# Patient Record
Sex: MALE | Race: BLACK OR AFRICAN AMERICAN
[De-identification: names, ages, dates, MRNs, and addresses within clinical notes are randomized per-mention and may not be internally consistent; named-entity substitution may affect disease eponyms.]

---

## 2017-06-20 ENCOUNTER — HOSPITAL ENCOUNTER (INPATIENT)
Dept: HOSPITAL 74 - YASAS | Age: 52
LOS: 28 days | Discharge: HOME | DRG: 895 | End: 2017-07-18
Attending: PSYCHIATRY & NEUROLOGY | Admitting: PSYCHIATRY & NEUROLOGY
Payer: COMMERCIAL

## 2017-06-20 VITALS — BODY MASS INDEX: 29.5 KG/M2

## 2017-06-20 DIAGNOSIS — F10.20: Primary | ICD-10-CM

## 2017-06-20 DIAGNOSIS — I10: ICD-10-CM

## 2017-06-20 DIAGNOSIS — F41.1: ICD-10-CM

## 2017-06-20 DIAGNOSIS — F17.210: ICD-10-CM

## 2017-06-20 DIAGNOSIS — K21.9: ICD-10-CM

## 2017-06-20 DIAGNOSIS — F32.9: ICD-10-CM

## 2017-06-20 LAB
ALBUMIN SERPL-MCNC: 4.2 G/DL (ref 3.4–5)
ALP SERPL-CCNC: 101 U/L (ref 45–117)
ALT SERPL-CCNC: 67 U/L (ref 12–78)
ANION GAP SERPL CALC-SCNC: 9 MMOL/L (ref 8–16)
APPEARANCE UR: (no result)
AST SERPL-CCNC: 62 U/L (ref 15–37)
BILIRUB SERPL-MCNC: 0.5 MG/DL (ref 0.2–1)
BILIRUB UR STRIP.AUTO-MCNC: NEGATIVE MG/DL
CALCIUM SERPL-MCNC: 9.7 MG/DL (ref 8.5–10.1)
CO2 SERPL-SCNC: 29 MMOL/L (ref 21–32)
COLOR UR: (no result)
CREAT SERPL-MCNC: 1.2 MG/DL (ref 0.7–1.3)
DEPRECATED RDW RBC AUTO: 15 % (ref 11.9–15.9)
GLUCOSE SERPL-MCNC: 86 MG/DL (ref 74–106)
KETONES UR QL STRIP: NEGATIVE
LEUKOCYTE ESTERASE UR QL STRIP.AUTO: NEGATIVE
MCH RBC QN AUTO: 28.5 PG (ref 25.7–33.7)
MCHC RBC AUTO-ENTMCNC: 32.7 G/DL (ref 32–35.9)
MCV RBC: 86.9 FL (ref 80–96)
MUCOUS THREADS URNS QL MICRO: (no result)
NITRITE UR QL STRIP: NEGATIVE
PH UR: 5 [PH] (ref 5–8)
PLATELET # BLD AUTO: 380 K/MM3 (ref 134–434)
PMV BLD: 7.9 FL (ref 7.5–11.1)
PROT SERPL-MCNC: 8.3 G/DL (ref 6.4–8.2)
PROT UR QL STRIP: (no result)
PROT UR QL STRIP: NEGATIVE
RBC # BLD AUTO: 3 /HPF (ref 0–3)
RBC # UR STRIP: NEGATIVE /UL
SP GR UR: 1.02 (ref 1–1.02)
UROBILINOGEN UR STRIP-MCNC: NEGATIVE E.U./DL (ref 0.2–1)
WBC # BLD AUTO: 7.4 K/MM3 (ref 4–10)
WBC # UR AUTO: 71 /HPF (ref 3–5)
YEAST #/AREA URNS HPF: (no result) /[HPF]

## 2017-06-20 RX ADMIN — MIRTAZAPINE SCH MG: 15 TABLET, FILM COATED ORAL at 21:49

## 2017-06-20 RX ADMIN — Medication SCH MG: at 21:49

## 2017-06-20 RX ADMIN — NICOTINE SCH: 14 PATCH, EXTENDED RELEASE TRANSDERMAL at 20:47

## 2017-06-20 RX ADMIN — ACETAMINOPHEN PRN MG: 325 TABLET ORAL at 19:16

## 2017-06-20 RX ADMIN — SERTRALINE HYDROCHLORIDE SCH MG: 50 TABLET ORAL at 21:48

## 2017-06-20 RX ADMIN — IBUPROFEN PRN MG: 400 TABLET, FILM COATED ORAL at 14:12

## 2017-06-20 NOTE — HP
Admission ROS [facility]





- [facility]


Chief Complaint: 


 franklin here for rehab from alcohol


Allergies/Adverse Reactions: 


 Allergies











Allergy/AdvReac Type Severity Reaction Status Date / Time


 


No Known Allergies Allergy   Verified 17 13:03











History of Present Illness: 


this 52 years old male with alcohol dependence,seeking rehab,,last detox [facility] 

from 06/15/17 to 17


hypertension


depression


no significant perios of sobriety


Exam Limitations: No Limitations





- Ebola screening


Have you traveled outside of the country in the last 21 days: No


Have you had contact with anyone from an Ebola affected area: No


Have you been sick,other than usual withdrawal symptoms: No





- Review of Systems


Constitutional: No Symptoms Reported


EENT: reports: No Symptoms Reported


Respiratory: reports: No Symptoms reported


Cardiac: reports: No Symptoms Reported


GI: reports: No Symptoms Reported


: reports: No Symptoms Reported


Musculoskeletal: reports: Neck Pain


Integumentary: reports: No Symptoms Reported


Neuro: reports: No Symptoms reported


Endocrine: reports: No Symptoms Reported


Hematology: reports: No Symptoms Reported


Psychiatric: reports: Mood/Affect Appropiate, Orientated x3, Depressed





Patient History





- Patient Medical History


Hx Anemia: No


Hx Asthma: No


Hx Chronic Obstructive Pulmonary Disease (COPD): No


Hx Cancer: No


Hx Cardiac Disorders: No


Hx Congestive Heart Failure: No


Hx Hypertension: Yes (on med)


Hx Hypercholesterolemia: No


Hx Pacemaker: No


HX Cerebrovascular Accident: No


Hx Seizures: No


Hx Diabetes: No


Hx Gastrointestinal Disorders: No


Hx Liver Disease: No


Hx Genitourinary Disorders: No


Hx Sexually Transmitted Disorders: No


Hx Renal Disease (ESRD): No


Hx Thyroid Disease: No


Hx Human Immunodeficiency Virus (HIV): No (last 2016 negative)


Hx Hepatitis C: No


Hx Depression: Yes (non compliant )


Hx Suicide Attempt: No


Hx Schizophrenia: No


Other Medical History: no suicidal,no homicidal





- Patient Surgical History


Past Surgical History: No


Hx Neurologic Surgery: No


Hx Cataract Extraction: No


Hx Cardiac Surgery: No


Hx Lung Surgery: No


Hx Breast Surgery: No


Hx Breast Biopsy: No


Hx Abdominal Surgery: Yes


Hx Appendectomy: No


Hx Cholecystectomy: No


Hx Genitourinary Surgery: No


Hx  Section: No


Hx Orthopedic Surgery: Yes (right tight abd had a colonostomy bg for 2 years 

prior to closure )


Anesthesia Reaction: No





- PPD History


Previous Implant?: Yes


Date: 16


PPD to be Administered?: Yes





- Smoking Cessation


Smoking history: Former smoker


Have you smoked in the past 12 months: Yes


Aproximately how many cigarettes per day: 3


Cigars Per Day: 0


Hx Chewing Tobacco Use: No


Initiated information on smoking cessation: Yes


'Breaking Loose' booklet given: 17





- Substance & Tx. History


Hx Alcohol Use: Yes


Substance Use Type: Alcohol


Hx Substance Use Treatment: Yes ([facility] 06/15/17 to 17)





- Substances Abused


  ** Alcohol


Route: Oral


Frequency: Daily


Amount used: 4 of 24 oz of liquor


Age of first use: 16


Date of Last Use: 06/15/17





Family Disease History





- Family Disease History


Family Disease History: CA: Grandparent, Other: Father (alcohol,), 

Mother (alcohol,)





Admission Physical Exam [facility]





- Vital Signs


Vital Signs: 


 Vital Signs - 24 hr











  17





  11:32


 


Temperature 97.3 F L


 


Pulse Rate 106 H


 


Respiratory 18





Rate 


 


Blood Pressure 137/87














- Physical


General Appearance: Yes: Within Normal Limits


HEENTM: Yes: Within Normal Limits, Normal ENT Inspection, Pharynx Normal


Respiratory: Yes: Lungs Clear, Normal Breath Sounds, No Respiratory Distress


Neck: Yes: Within Normal Limits


Breast: Yes: Within Normal Limits


Cardiology: Yes: Within Normal Limits, Regular Rhythm, Regular Rate, S1, S2


Abdominal: Yes: Within Normal Limits, Normal Bowel Sounds, Non Tender, Flat, 

Soft, Surgical Scar


Genitourinary: Yes: Within Normal Limits


Back: Yes: Within Normal Limits


Musculoskeletal: Yes: Within Normal Limits


Extremities: Yes: Within Normal Limits


Neurological: Yes: CNs II-XII NML intact, Fully Oriented, Alert, Motor Strength 

5/5


Integumentary: Yes: Within Normal Limits


Lymphatic: Yes: Within Normal Limits





- Diagnostic


(1) Alcohol dependence


Current Visit: No   Status: Acute   





(2) Hypertension


Current Visit: Yes   Status: Acute   





(3) Depression


Current Visit: Yes   Status: Acute   








Cleared for Admission [facility]





- Detox or Rehab


Claeared for Rehab Admission: Yes





[facility] Breath Alcohol Content


Breath Alcohol Content: 0





Urine Drug Screen





- Results


Drug Screen Negative: No


Urine Drug Screen Results: BZO-Benzodiazepines

## 2017-06-21 RX ADMIN — NICOTINE SCH: 14 PATCH, EXTENDED RELEASE TRANSDERMAL at 10:33

## 2017-06-21 RX ADMIN — HYDROXYZINE PAMOATE PRN MG: 50 CAPSULE ORAL at 22:14

## 2017-06-21 RX ADMIN — MIRTAZAPINE SCH MG: 15 TABLET, FILM COATED ORAL at 21:27

## 2017-06-21 RX ADMIN — LISINOPRIL SCH MG: 20 TABLET ORAL at 10:31

## 2017-06-21 RX ADMIN — Medication SCH MG: at 21:27

## 2017-06-21 RX ADMIN — SERTRALINE HYDROCHLORIDE SCH MG: 50 TABLET ORAL at 10:31

## 2017-06-21 RX ADMIN — IBUPROFEN PRN MG: 400 TABLET, FILM COATED ORAL at 21:27

## 2017-06-21 RX ADMIN — Medication SCH TAB: at 10:31

## 2017-06-21 RX ADMIN — HYDROXYZINE PAMOATE PRN MG: 50 CAPSULE ORAL at 11:01
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
## 2017-06-21 NOTE — EKG
Test Reason : 

Blood Pressure : ***/*** mmHG

Vent. Rate : 089 BPM     Atrial Rate : 089 BPM

   P-R Int : 186 ms          QRS Dur : 092 ms

    QT Int : 384 ms       P-R-T Axes : 055 000 006 degrees

   QTc Int : 467 ms

 

NORMAL SINUS RHYTHM

POSSIBLE LEFT ATRIAL ENLARGEMENT

LEFT VENTRICULAR HYPERTROPHY

ABNORMAL ECG

NO PREVIOUS ECGS AVAILABLE

Confirmed by DAVIDSON SAMANO MD ([id number]) on 6/21/2017 11:05:04 AM

 

Referred By:             Confirmed By:DAVIDSON SAMANO MD

## 2017-06-22 RX ADMIN — Medication SCH MG: at 21:50

## 2017-06-22 RX ADMIN — CYCLOBENZAPRINE HYDROCHLORIDE PRN MG: 10 TABLET, FILM COATED ORAL at 18:45

## 2017-06-22 RX ADMIN — HYDROXYZINE PAMOATE PRN MG: 50 CAPSULE ORAL at 21:52

## 2017-06-22 RX ADMIN — HYDROXYZINE PAMOATE PRN MG: 50 CAPSULE ORAL at 10:21

## 2017-06-22 RX ADMIN — SERTRALINE HYDROCHLORIDE SCH MG: 50 TABLET ORAL at 10:20

## 2017-06-22 RX ADMIN — NICOTINE SCH: 14 PATCH, EXTENDED RELEASE TRANSDERMAL at 10:23

## 2017-06-22 RX ADMIN — Medication SCH TAB: at 10:20

## 2017-06-22 RX ADMIN — IBUPROFEN PRN MG: 600 TABLET, FILM COATED ORAL at 18:46

## 2017-06-22 RX ADMIN — MIRTAZAPINE SCH MG: 15 TABLET, FILM COATED ORAL at 21:49

## 2017-06-22 RX ADMIN — LISINOPRIL SCH MG: 20 TABLET ORAL at 10:21

## 2017-06-23 RX ADMIN — MIRTAZAPINE SCH MG: 15 TABLET, FILM COATED ORAL at 21:46

## 2017-06-23 RX ADMIN — LISINOPRIL SCH MG: 20 TABLET ORAL at 10:21

## 2017-06-23 RX ADMIN — IBUPROFEN PRN MG: 600 TABLET, FILM COATED ORAL at 10:22

## 2017-06-23 RX ADMIN — Medication SCH TAB: at 10:21

## 2017-06-23 RX ADMIN — IBUPROFEN PRN MG: 600 TABLET, FILM COATED ORAL at 21:45

## 2017-06-23 RX ADMIN — CYCLOBENZAPRINE HYDROCHLORIDE PRN MG: 10 TABLET, FILM COATED ORAL at 10:22

## 2017-06-23 RX ADMIN — NICOTINE SCH: 14 PATCH, EXTENDED RELEASE TRANSDERMAL at 10:21

## 2017-06-23 RX ADMIN — CYCLOBENZAPRINE HYDROCHLORIDE PRN MG: 10 TABLET, FILM COATED ORAL at 21:46

## 2017-06-23 RX ADMIN — SERTRALINE HYDROCHLORIDE SCH MG: 50 TABLET ORAL at 10:21

## 2017-06-23 RX ADMIN — Medication SCH MG: at 21:46

## 2017-06-24 RX ADMIN — HYDROXYZINE PAMOATE PRN MG: 50 CAPSULE ORAL at 21:35

## 2017-06-24 RX ADMIN — LISINOPRIL SCH MG: 20 TABLET ORAL at 10:14

## 2017-06-24 RX ADMIN — Medication SCH TAB: at 10:14

## 2017-06-24 RX ADMIN — NICOTINE SCH: 14 PATCH, EXTENDED RELEASE TRANSDERMAL at 10:14

## 2017-06-24 RX ADMIN — CYCLOBENZAPRINE HYDROCHLORIDE PRN MG: 10 TABLET, FILM COATED ORAL at 10:16

## 2017-06-24 RX ADMIN — SERTRALINE HYDROCHLORIDE SCH MG: 50 TABLET ORAL at 10:14

## 2017-06-24 RX ADMIN — Medication SCH MG: at 21:35

## 2017-06-24 RX ADMIN — CYCLOBENZAPRINE HYDROCHLORIDE PRN MG: 10 TABLET, FILM COATED ORAL at 21:35

## 2017-06-24 RX ADMIN — MIRTAZAPINE SCH MG: 15 TABLET, FILM COATED ORAL at 21:35

## 2017-06-24 RX ADMIN — IBUPROFEN PRN MG: 600 TABLET, FILM COATED ORAL at 21:35

## 2017-06-24 RX ADMIN — IBUPROFEN PRN MG: 600 TABLET, FILM COATED ORAL at 10:16

## 2017-06-25 RX ADMIN — Medication SCH TAB: at 10:27

## 2017-06-25 RX ADMIN — CYCLOBENZAPRINE HYDROCHLORIDE PRN MG: 10 TABLET, FILM COATED ORAL at 10:30

## 2017-06-25 RX ADMIN — HYDROXYZINE PAMOATE PRN MG: 50 CAPSULE ORAL at 21:50

## 2017-06-25 RX ADMIN — Medication SCH MG: at 21:47

## 2017-06-25 RX ADMIN — HYDROXYZINE PAMOATE PRN MG: 50 CAPSULE ORAL at 16:11

## 2017-06-25 RX ADMIN — IBUPROFEN PRN MG: 600 TABLET, FILM COATED ORAL at 21:49

## 2017-06-25 RX ADMIN — NICOTINE SCH: 14 PATCH, EXTENDED RELEASE TRANSDERMAL at 10:27

## 2017-06-25 RX ADMIN — MIRTAZAPINE SCH MG: 15 TABLET, FILM COATED ORAL at 21:47

## 2017-06-25 RX ADMIN — IBUPROFEN PRN MG: 600 TABLET, FILM COATED ORAL at 10:29

## 2017-06-25 RX ADMIN — LISINOPRIL SCH MG: 20 TABLET ORAL at 10:28

## 2017-06-25 RX ADMIN — ALUMINUM HYDROXIDE, MAGNESIUM HYDROXIDE, AND SIMETHICONE PRN ML: 200; 200; 20 SUSPENSION ORAL at 16:11

## 2017-06-25 RX ADMIN — SERTRALINE HYDROCHLORIDE SCH MG: 50 TABLET ORAL at 10:28

## 2017-06-26 RX ADMIN — MIRTAZAPINE SCH MG: 15 TABLET, FILM COATED ORAL at 21:32

## 2017-06-26 RX ADMIN — SERTRALINE HYDROCHLORIDE SCH MG: 50 TABLET ORAL at 10:37

## 2017-06-26 RX ADMIN — IBUPROFEN PRN MG: 600 TABLET, FILM COATED ORAL at 10:38

## 2017-06-26 RX ADMIN — HYDROXYZINE PAMOATE PRN MG: 50 CAPSULE ORAL at 20:10

## 2017-06-26 RX ADMIN — Medication SCH MG: at 21:34

## 2017-06-26 RX ADMIN — IBUPROFEN PRN MG: 600 TABLET, FILM COATED ORAL at 21:33

## 2017-06-26 RX ADMIN — HYDROXYZINE PAMOATE PRN MG: 50 CAPSULE ORAL at 10:37

## 2017-06-26 RX ADMIN — ALUMINUM HYDROXIDE, MAGNESIUM HYDROXIDE, AND SIMETHICONE PRN ML: 200; 200; 20 SUSPENSION ORAL at 20:11

## 2017-06-26 RX ADMIN — LISINOPRIL SCH MG: 20 TABLET ORAL at 10:40

## 2017-06-26 RX ADMIN — CYCLOBENZAPRINE HYDROCHLORIDE PRN MG: 10 TABLET, FILM COATED ORAL at 21:32

## 2017-06-26 RX ADMIN — NICOTINE SCH: 14 PATCH, EXTENDED RELEASE TRANSDERMAL at 10:39

## 2017-06-26 RX ADMIN — Medication SCH TAB: at 10:37

## 2017-06-27 RX ADMIN — Medication SCH TAB: at 10:21

## 2017-06-27 RX ADMIN — HYDROXYZINE PAMOATE PRN MG: 50 CAPSULE ORAL at 20:25

## 2017-06-27 RX ADMIN — IBUPROFEN PRN MG: 600 TABLET, FILM COATED ORAL at 10:23

## 2017-06-27 RX ADMIN — SERTRALINE HYDROCHLORIDE SCH MG: 50 TABLET ORAL at 10:22

## 2017-06-27 RX ADMIN — NICOTINE SCH: 14 PATCH, EXTENDED RELEASE TRANSDERMAL at 10:22

## 2017-06-27 RX ADMIN — Medication SCH MG: at 21:43

## 2017-06-27 RX ADMIN — ANALGESIC BALM SCH APPLIC: 1.74; 4.06 OINTMENT TOPICAL at 10:21

## 2017-06-27 RX ADMIN — CYCLOBENZAPRINE HYDROCHLORIDE PRN MG: 10 TABLET, FILM COATED ORAL at 21:45

## 2017-06-27 RX ADMIN — IBUPROFEN PRN MG: 600 TABLET, FILM COATED ORAL at 21:44

## 2017-06-27 RX ADMIN — CYCLOBENZAPRINE HYDROCHLORIDE PRN MG: 10 TABLET, FILM COATED ORAL at 10:23

## 2017-06-27 RX ADMIN — ALUMINUM HYDROXIDE, MAGNESIUM HYDROXIDE, AND SIMETHICONE PRN ML: 200; 200; 20 SUSPENSION ORAL at 20:25

## 2017-06-27 RX ADMIN — MIRTAZAPINE SCH MG: 15 TABLET, FILM COATED ORAL at 21:43

## 2017-06-27 RX ADMIN — ANALGESIC BALM SCH: 1.74; 4.06 OINTMENT TOPICAL at 21:43

## 2017-06-27 RX ADMIN — LISINOPRIL SCH MG: 20 TABLET ORAL at 10:21

## 2017-06-27 NOTE — PN
[facility] Progress Note (SOAP)


Subjective: 


neck pain x 1 week worse with left sided movements


Objective: 





06/27/17 08:59


 Vital Signs











Temperature  97.9 F   06/27/17 06:50


 


Pulse Rate  83   06/27/17 06:50


 


Respiratory Rate  18   06/27/17 06:50


 


Blood Pressure  136/87   06/27/17 06:50


 


O2 Sat by Pulse Oximetry (%)      








 Laboratory Tests











  06/20/17 06/20/17 06/20/17





  13:00 13:00 13:00


 


WBC  7.4  D  


 


RBC  4.91  


 


Hgb  14.0  


 


Hct  42.7  


 


MCV  86.9  


 


MCHC  32.7  


 


RDW  15.0  


 


Plt Count  380  D  


 


MPV  7.9  


 


Sodium   136 


 


Potassium   4.3 


 


Chloride   98 


 


Carbon Dioxide   29 


 


Anion Gap   9 


 


BUN   16  D 


 


Creatinine   1.2 


 


Creat Clearance w eGFR   > 60 


 


Random Glucose   86 


 


Calcium   9.7 


 


Total Bilirubin   0.5 


 


AST   62 H D 


 


ALT   67  D 


 


Alkaline Phosphatase   101 


 


Total Protein   8.3 H 


 


Albumin   4.2 


 


Urine Color   


 


Urine Appearance   


 


Urine pH   


 


Ur Specific Gravity   


 


Urine Protein   


 


Urine Glucose (UA)   


 


Urine Ketones   


 


Urine Blood   


 


Urine Nitrite   


 


Urine Bilirubin   


 


Urine Urobilinogen   


 


Ur Leukocyte Esterase   


 


Urine RBC   


 


Urine WBC   


 


Urine Mucus   


 


Urine Yeast   


 


RPR Titer    Nonreactive














  06/20/17





  15:00


 


WBC 


 


RBC 


 


Hgb 


 


Hct 


 


MCV 


 


MCHC 


 


RDW 


 


Plt Count 


 


MPV 


 


Sodium 


 


Potassium 


 


Chloride 


 


Carbon Dioxide 


 


Anion Gap 


 


BUN 


 


Creatinine 


 


Creat Clearance w eGFR 


 


Random Glucose 


 


Calcium 


 


Total Bilirubin 


 


AST 


 


ALT 


 


Alkaline Phosphatase 


 


Total Protein 


 


Albumin 


 


Urine Color  Green


 


Urine Appearance  Turbid


 


Urine pH  5.0


 


Ur Specific Gravity  1.025


 


Urine Protein  1+ H


 


Urine Glucose (UA)  Negative


 


Urine Ketones  Negative


 


Urine Blood  Negative


 


Urine Nitrite  Negative


 


Urine Bilirubin  Negative


 


Urine Urobilinogen  Negative


 


Ur Leukocyte Esterase  Negative


 


Urine RBC  3


 


Urine WBC  71


 


Urine Mucus  Rare


 


Urine Yeast  Many


 


RPR Titer 








pt aox3 in nad ambulatng 


neck soft +dolor on left sided movemnts 


Assessment: 





06/27/17 09:02


neck pain -likely musculosketetal 


Plan: 


motri 600mg tid


flexeril 10mg tid 


x ray c-spine 


analgesic balm

## 2017-06-28 LAB
APPEARANCE UR: CLEAR
BILIRUB UR STRIP.AUTO-MCNC: NEGATIVE MG/DL
COLOR UR: (no result)
KETONES UR QL STRIP: NEGATIVE
LEUKOCYTE ESTERASE UR QL STRIP.AUTO: NEGATIVE
NITRITE UR QL STRIP: NEGATIVE
PH UR: 5 [PH] (ref 5–8)
PROT UR QL STRIP: NEGATIVE
PROT UR QL STRIP: NEGATIVE
RBC # UR STRIP: NEGATIVE /UL
SP GR UR: 1.02 (ref 1–1.02)
UROBILINOGEN UR STRIP-MCNC: NEGATIVE E.U./DL (ref 0.2–1)

## 2017-06-28 RX ADMIN — ANALGESIC BALM SCH APPLIC: 1.74; 4.06 OINTMENT TOPICAL at 22:32

## 2017-06-28 RX ADMIN — NICOTINE SCH: 14 PATCH, EXTENDED RELEASE TRANSDERMAL at 10:05

## 2017-06-28 RX ADMIN — IBUPROFEN PRN MG: 600 TABLET, FILM COATED ORAL at 09:48

## 2017-06-28 RX ADMIN — Medication SCH TAB: at 09:47

## 2017-06-28 RX ADMIN — Medication SCH MG: at 21:32

## 2017-06-28 RX ADMIN — CYCLOBENZAPRINE HYDROCHLORIDE PRN MG: 10 TABLET, FILM COATED ORAL at 09:48

## 2017-06-28 RX ADMIN — HYDROXYZINE PAMOATE PRN MG: 50 CAPSULE ORAL at 21:32

## 2017-06-28 RX ADMIN — ANALGESIC BALM SCH: 1.74; 4.06 OINTMENT TOPICAL at 10:05

## 2017-06-28 RX ADMIN — ALUMINUM HYDROXIDE, MAGNESIUM HYDROXIDE, AND SIMETHICONE PRN ML: 200; 200; 20 SUSPENSION ORAL at 18:16

## 2017-06-28 RX ADMIN — SERTRALINE HYDROCHLORIDE SCH MG: 50 TABLET ORAL at 09:47

## 2017-06-28 RX ADMIN — LISINOPRIL SCH MG: 20 TABLET ORAL at 09:47

## 2017-06-28 RX ADMIN — MIRTAZAPINE SCH MG: 15 TABLET, FILM COATED ORAL at 21:32

## 2017-06-28 RX ADMIN — ANALGESIC BALM SCH: 1.74; 4.06 OINTMENT TOPICAL at 22:00

## 2017-06-28 RX ADMIN — CYCLOBENZAPRINE HYDROCHLORIDE PRN MG: 10 TABLET, FILM COATED ORAL at 21:32

## 2017-06-28 RX ADMIN — IBUPROFEN PRN MG: 600 TABLET, FILM COATED ORAL at 21:32

## 2017-06-29 RX ADMIN — IBUPROFEN PRN MG: 600 TABLET, FILM COATED ORAL at 10:06

## 2017-06-29 RX ADMIN — Medication SCH MG: at 21:06

## 2017-06-29 RX ADMIN — HYDROXYZINE PAMOATE PRN MG: 50 CAPSULE ORAL at 21:07

## 2017-06-29 RX ADMIN — ANALGESIC BALM SCH APPLIC: 1.74; 4.06 OINTMENT TOPICAL at 21:05

## 2017-06-29 RX ADMIN — CYCLOBENZAPRINE HYDROCHLORIDE PRN MG: 10 TABLET, FILM COATED ORAL at 21:09

## 2017-06-29 RX ADMIN — HYDROXYZINE PAMOATE PRN MG: 50 CAPSULE ORAL at 10:06

## 2017-06-29 RX ADMIN — LISINOPRIL SCH MG: 20 TABLET ORAL at 10:05

## 2017-06-29 RX ADMIN — IBUPROFEN PRN MG: 600 TABLET, FILM COATED ORAL at 21:07

## 2017-06-29 RX ADMIN — NICOTINE SCH: 14 PATCH, EXTENDED RELEASE TRANSDERMAL at 10:08

## 2017-06-29 RX ADMIN — Medication SCH TAB: at 10:09

## 2017-06-29 RX ADMIN — ALUMINUM HYDROXIDE, MAGNESIUM HYDROXIDE, AND SIMETHICONE PRN ML: 200; 200; 20 SUSPENSION ORAL at 15:59

## 2017-06-29 RX ADMIN — MIRTAZAPINE SCH MG: 15 TABLET, FILM COATED ORAL at 21:06

## 2017-06-29 RX ADMIN — ANALGESIC BALM SCH: 1.74; 4.06 OINTMENT TOPICAL at 10:08

## 2017-06-29 RX ADMIN — SERTRALINE HYDROCHLORIDE SCH MG: 50 TABLET ORAL at 10:05

## 2017-06-30 RX ADMIN — HYDROXYZINE PAMOATE PRN MG: 50 CAPSULE ORAL at 21:38

## 2017-06-30 RX ADMIN — Medication SCH MG: at 21:38

## 2017-06-30 RX ADMIN — MIRTAZAPINE SCH MG: 15 TABLET, FILM COATED ORAL at 21:39

## 2017-06-30 RX ADMIN — NICOTINE SCH: 14 PATCH, EXTENDED RELEASE TRANSDERMAL at 10:21

## 2017-06-30 RX ADMIN — Medication SCH TAB: at 10:21

## 2017-06-30 RX ADMIN — CYCLOBENZAPRINE HYDROCHLORIDE PRN MG: 10 TABLET, FILM COATED ORAL at 10:23

## 2017-06-30 RX ADMIN — ANALGESIC BALM SCH: 1.74; 4.06 OINTMENT TOPICAL at 10:21

## 2017-06-30 RX ADMIN — ANALGESIC BALM SCH: 1.74; 4.06 OINTMENT TOPICAL at 22:56

## 2017-06-30 RX ADMIN — IBUPROFEN PRN MG: 600 TABLET, FILM COATED ORAL at 10:23

## 2017-06-30 RX ADMIN — LISINOPRIL SCH MG: 20 TABLET ORAL at 10:21

## 2017-06-30 RX ADMIN — CYCLOBENZAPRINE HYDROCHLORIDE PRN MG: 10 TABLET, FILM COATED ORAL at 21:39

## 2017-06-30 RX ADMIN — SERTRALINE HYDROCHLORIDE SCH MG: 50 TABLET ORAL at 10:21

## 2017-06-30 RX ADMIN — IBUPROFEN PRN MG: 600 TABLET, FILM COATED ORAL at 21:39

## 2017-07-01 RX ADMIN — IBUPROFEN PRN MG: 600 TABLET, FILM COATED ORAL at 21:16

## 2017-07-01 RX ADMIN — Medication SCH MG: at 21:15

## 2017-07-01 RX ADMIN — SERTRALINE HYDROCHLORIDE SCH MG: 50 TABLET ORAL at 10:06

## 2017-07-01 RX ADMIN — IBUPROFEN PRN MG: 600 TABLET, FILM COATED ORAL at 10:07

## 2017-07-01 RX ADMIN — NICOTINE SCH: 14 PATCH, EXTENDED RELEASE TRANSDERMAL at 10:08

## 2017-07-01 RX ADMIN — Medication SCH TAB: at 10:06

## 2017-07-01 RX ADMIN — LISINOPRIL SCH MG: 20 TABLET ORAL at 10:06

## 2017-07-01 RX ADMIN — MIRTAZAPINE SCH MG: 15 TABLET, FILM COATED ORAL at 21:15

## 2017-07-01 RX ADMIN — ANALGESIC BALM SCH APPLIC: 1.74; 4.06 OINTMENT TOPICAL at 21:14

## 2017-07-01 RX ADMIN — ANALGESIC BALM SCH: 1.74; 4.06 OINTMENT TOPICAL at 10:08

## 2017-07-01 RX ADMIN — HYDROXYZINE PAMOATE PRN MG: 50 CAPSULE ORAL at 10:07

## 2017-07-02 RX ADMIN — SERTRALINE HYDROCHLORIDE SCH MG: 50 TABLET ORAL at 10:12

## 2017-07-02 RX ADMIN — IBUPROFEN PRN MG: 600 TABLET, FILM COATED ORAL at 21:30

## 2017-07-02 RX ADMIN — IBUPROFEN PRN MG: 600 TABLET, FILM COATED ORAL at 10:14

## 2017-07-02 RX ADMIN — Medication SCH MG: at 21:28

## 2017-07-02 RX ADMIN — CYCLOBENZAPRINE HYDROCHLORIDE PRN MG: 10 TABLET, FILM COATED ORAL at 21:30

## 2017-07-02 RX ADMIN — CYCLOBENZAPRINE HYDROCHLORIDE PRN MG: 10 TABLET, FILM COATED ORAL at 10:13

## 2017-07-02 RX ADMIN — NICOTINE SCH: 14 PATCH, EXTENDED RELEASE TRANSDERMAL at 10:13

## 2017-07-02 RX ADMIN — ANALGESIC BALM SCH APPLIC: 1.74; 4.06 OINTMENT TOPICAL at 21:28

## 2017-07-02 RX ADMIN — Medication SCH TAB: at 10:12

## 2017-07-02 RX ADMIN — ALUMINUM HYDROXIDE, MAGNESIUM HYDROXIDE, AND SIMETHICONE PRN ML: 200; 200; 20 SUSPENSION ORAL at 19:13

## 2017-07-02 RX ADMIN — LISINOPRIL SCH MG: 20 TABLET ORAL at 10:12

## 2017-07-02 RX ADMIN — ANALGESIC BALM SCH: 1.74; 4.06 OINTMENT TOPICAL at 10:13

## 2017-07-02 RX ADMIN — HYDROXYZINE PAMOATE PRN MG: 50 CAPSULE ORAL at 21:30

## 2017-07-02 RX ADMIN — MIRTAZAPINE SCH MG: 15 TABLET, FILM COATED ORAL at 21:28

## 2017-07-03 RX ADMIN — IBUPROFEN PRN MG: 600 TABLET, FILM COATED ORAL at 21:30

## 2017-07-03 RX ADMIN — HYDROXYZINE PAMOATE PRN MG: 50 CAPSULE ORAL at 09:54

## 2017-07-03 RX ADMIN — IBUPROFEN PRN MG: 600 TABLET, FILM COATED ORAL at 09:54

## 2017-07-03 RX ADMIN — HYDROXYZINE PAMOATE PRN MG: 50 CAPSULE ORAL at 21:31

## 2017-07-03 RX ADMIN — LISINOPRIL SCH MG: 20 TABLET ORAL at 09:52

## 2017-07-03 RX ADMIN — MIRTAZAPINE SCH MG: 15 TABLET, FILM COATED ORAL at 21:30

## 2017-07-03 RX ADMIN — ANALGESIC BALM SCH APPLIC: 1.74; 4.06 OINTMENT TOPICAL at 09:51

## 2017-07-03 RX ADMIN — Medication SCH MG: at 21:30

## 2017-07-03 RX ADMIN — SERTRALINE HYDROCHLORIDE SCH MG: 50 TABLET ORAL at 09:52

## 2017-07-03 RX ADMIN — PANTOPRAZOLE SODIUM SCH MG: 40 TABLET, DELAYED RELEASE ORAL at 09:52

## 2017-07-03 RX ADMIN — Medication SCH TAB: at 09:52

## 2017-07-03 RX ADMIN — ANALGESIC BALM SCH APPLIC: 1.74; 4.06 OINTMENT TOPICAL at 21:32

## 2017-07-03 RX ADMIN — CYCLOBENZAPRINE HYDROCHLORIDE PRN MG: 10 TABLET, FILM COATED ORAL at 21:30

## 2017-07-03 RX ADMIN — NICOTINE SCH: 14 PATCH, EXTENDED RELEASE TRANSDERMAL at 09:55

## 2017-07-03 NOTE — PN
[facility] Progress Note


Note: 


patient has history of gerd on omeprazol at home,will give protonix 40 mgs po 

daily

## 2017-07-04 RX ADMIN — PANTOPRAZOLE SODIUM SCH MG: 40 TABLET, DELAYED RELEASE ORAL at 10:12

## 2017-07-04 RX ADMIN — NICOTINE SCH: 14 PATCH, EXTENDED RELEASE TRANSDERMAL at 10:11

## 2017-07-04 RX ADMIN — LISINOPRIL SCH MG: 20 TABLET ORAL at 10:12

## 2017-07-04 RX ADMIN — Medication SCH MG: at 21:31

## 2017-07-04 RX ADMIN — IBUPROFEN PRN MG: 600 TABLET, FILM COATED ORAL at 21:33

## 2017-07-04 RX ADMIN — IBUPROFEN PRN MG: 600 TABLET, FILM COATED ORAL at 10:13

## 2017-07-04 RX ADMIN — HYDROXYZINE PAMOATE PRN MG: 50 CAPSULE ORAL at 10:13

## 2017-07-04 RX ADMIN — ANALGESIC BALM SCH: 1.74; 4.06 OINTMENT TOPICAL at 10:11

## 2017-07-04 RX ADMIN — ANALGESIC BALM SCH: 1.74; 4.06 OINTMENT TOPICAL at 22:56

## 2017-07-04 RX ADMIN — SERTRALINE HYDROCHLORIDE SCH MG: 50 TABLET ORAL at 10:12

## 2017-07-04 RX ADMIN — HYDROXYZINE PAMOATE PRN MG: 50 CAPSULE ORAL at 21:33

## 2017-07-04 RX ADMIN — Medication SCH TAB: at 10:11

## 2017-07-04 RX ADMIN — MIRTAZAPINE SCH MG: 15 TABLET, FILM COATED ORAL at 21:31

## 2017-07-04 RX ADMIN — CYCLOBENZAPRINE HYDROCHLORIDE PRN MG: 10 TABLET, FILM COATED ORAL at 21:33

## 2017-07-05 RX ADMIN — CYCLOBENZAPRINE HYDROCHLORIDE PRN MG: 10 TABLET, FILM COATED ORAL at 21:21

## 2017-07-05 RX ADMIN — PANTOPRAZOLE SODIUM SCH MG: 40 TABLET, DELAYED RELEASE ORAL at 09:53

## 2017-07-05 RX ADMIN — HYDROXYZINE PAMOATE PRN MG: 50 CAPSULE ORAL at 21:21

## 2017-07-05 RX ADMIN — ANALGESIC BALM SCH: 1.74; 4.06 OINTMENT TOPICAL at 22:19

## 2017-07-05 RX ADMIN — MIRTAZAPINE SCH MG: 15 TABLET, FILM COATED ORAL at 21:20

## 2017-07-05 RX ADMIN — SERTRALINE HYDROCHLORIDE SCH MG: 50 TABLET ORAL at 09:52

## 2017-07-05 RX ADMIN — HYDROXYZINE PAMOATE PRN MG: 50 CAPSULE ORAL at 09:54

## 2017-07-05 RX ADMIN — Medication SCH MG: at 21:20

## 2017-07-05 RX ADMIN — IBUPROFEN PRN MG: 600 TABLET, FILM COATED ORAL at 21:21

## 2017-07-05 RX ADMIN — NICOTINE SCH: 14 PATCH, EXTENDED RELEASE TRANSDERMAL at 09:57

## 2017-07-05 RX ADMIN — IBUPROFEN PRN MG: 600 TABLET, FILM COATED ORAL at 09:54

## 2017-07-05 RX ADMIN — Medication SCH TAB: at 09:53

## 2017-07-05 RX ADMIN — CYCLOBENZAPRINE HYDROCHLORIDE PRN MG: 10 TABLET, FILM COATED ORAL at 09:54

## 2017-07-05 RX ADMIN — ANALGESIC BALM SCH: 1.74; 4.06 OINTMENT TOPICAL at 09:56

## 2017-07-05 RX ADMIN — LISINOPRIL SCH MG: 20 TABLET ORAL at 09:57

## 2017-07-06 RX ADMIN — CYCLOBENZAPRINE HYDROCHLORIDE PRN MG: 10 TABLET, FILM COATED ORAL at 21:17

## 2017-07-06 RX ADMIN — Medication SCH TAB: at 09:56

## 2017-07-06 RX ADMIN — MIRTAZAPINE SCH MG: 15 TABLET, FILM COATED ORAL at 21:16

## 2017-07-06 RX ADMIN — IBUPROFEN PRN MG: 600 TABLET, FILM COATED ORAL at 09:58

## 2017-07-06 RX ADMIN — PANTOPRAZOLE SODIUM SCH MG: 40 TABLET, DELAYED RELEASE ORAL at 09:57

## 2017-07-06 RX ADMIN — ANALGESIC BALM SCH: 1.74; 4.06 OINTMENT TOPICAL at 10:03

## 2017-07-06 RX ADMIN — HYDROXYZINE PAMOATE PRN MG: 50 CAPSULE ORAL at 09:58

## 2017-07-06 RX ADMIN — Medication SCH MG: at 21:16

## 2017-07-06 RX ADMIN — HYDROXYZINE PAMOATE PRN MG: 50 CAPSULE ORAL at 21:18

## 2017-07-06 RX ADMIN — CYCLOBENZAPRINE HYDROCHLORIDE PRN MG: 10 TABLET, FILM COATED ORAL at 09:58

## 2017-07-06 RX ADMIN — IBUPROFEN PRN MG: 600 TABLET, FILM COATED ORAL at 21:17

## 2017-07-06 RX ADMIN — NICOTINE SCH: 14 PATCH, EXTENDED RELEASE TRANSDERMAL at 10:04

## 2017-07-06 RX ADMIN — LISINOPRIL SCH MG: 20 TABLET ORAL at 09:56

## 2017-07-06 RX ADMIN — ANALGESIC BALM SCH APPLIC: 1.74; 4.06 OINTMENT TOPICAL at 21:16

## 2017-07-06 RX ADMIN — SERTRALINE HYDROCHLORIDE SCH MG: 50 TABLET ORAL at 09:56

## 2017-07-07 RX ADMIN — ANALGESIC BALM SCH APPLIC: 1.74; 4.06 OINTMENT TOPICAL at 21:12

## 2017-07-07 RX ADMIN — CYCLOBENZAPRINE HYDROCHLORIDE PRN MG: 10 TABLET, FILM COATED ORAL at 10:04

## 2017-07-07 RX ADMIN — PANTOPRAZOLE SODIUM SCH MG: 40 TABLET, DELAYED RELEASE ORAL at 10:04

## 2017-07-07 RX ADMIN — MIRTAZAPINE SCH MG: 15 TABLET, FILM COATED ORAL at 21:13

## 2017-07-07 RX ADMIN — IBUPROFEN PRN MG: 600 TABLET, FILM COATED ORAL at 10:04

## 2017-07-07 RX ADMIN — HYDROXYZINE PAMOATE PRN MG: 50 CAPSULE ORAL at 21:13

## 2017-07-07 RX ADMIN — CYCLOBENZAPRINE HYDROCHLORIDE PRN MG: 10 TABLET, FILM COATED ORAL at 21:13

## 2017-07-07 RX ADMIN — Medication SCH MG: at 21:13

## 2017-07-07 RX ADMIN — NICOTINE SCH: 14 PATCH, EXTENDED RELEASE TRANSDERMAL at 10:01

## 2017-07-07 RX ADMIN — SERTRALINE HYDROCHLORIDE SCH MG: 50 TABLET ORAL at 10:01

## 2017-07-07 RX ADMIN — IBUPROFEN PRN MG: 600 TABLET, FILM COATED ORAL at 21:12

## 2017-07-07 RX ADMIN — LISINOPRIL SCH MG: 20 TABLET ORAL at 10:01

## 2017-07-07 RX ADMIN — Medication SCH TAB: at 10:01

## 2017-07-07 RX ADMIN — ANALGESIC BALM SCH: 1.74; 4.06 OINTMENT TOPICAL at 10:01

## 2017-07-08 RX ADMIN — CYCLOBENZAPRINE HYDROCHLORIDE PRN MG: 10 TABLET, FILM COATED ORAL at 21:28

## 2017-07-08 RX ADMIN — LISINOPRIL SCH MG: 20 TABLET ORAL at 09:21

## 2017-07-08 RX ADMIN — ANALGESIC BALM SCH: 1.74; 4.06 OINTMENT TOPICAL at 09:22

## 2017-07-08 RX ADMIN — SERTRALINE HYDROCHLORIDE SCH MG: 50 TABLET ORAL at 09:16

## 2017-07-08 RX ADMIN — MIRTAZAPINE SCH MG: 15 TABLET, FILM COATED ORAL at 21:24

## 2017-07-08 RX ADMIN — CYCLOBENZAPRINE HYDROCHLORIDE PRN MG: 10 TABLET, FILM COATED ORAL at 09:18

## 2017-07-08 RX ADMIN — ANALGESIC BALM SCH APPLIC: 1.74; 4.06 OINTMENT TOPICAL at 21:24

## 2017-07-08 RX ADMIN — Medication SCH MG: at 21:24

## 2017-07-08 RX ADMIN — PANTOPRAZOLE SODIUM SCH MG: 40 TABLET, DELAYED RELEASE ORAL at 09:17

## 2017-07-08 RX ADMIN — Medication SCH TAB: at 09:16

## 2017-07-08 RX ADMIN — IBUPROFEN PRN MG: 600 TABLET, FILM COATED ORAL at 21:26

## 2017-07-08 RX ADMIN — HYDROXYZINE PAMOATE PRN MG: 50 CAPSULE ORAL at 09:18

## 2017-07-08 RX ADMIN — NICOTINE SCH: 14 PATCH, EXTENDED RELEASE TRANSDERMAL at 09:22

## 2017-07-09 RX ADMIN — NICOTINE SCH: 14 PATCH, EXTENDED RELEASE TRANSDERMAL at 09:55

## 2017-07-09 RX ADMIN — SERTRALINE HYDROCHLORIDE SCH MG: 50 TABLET ORAL at 09:51

## 2017-07-09 RX ADMIN — CYCLOBENZAPRINE HYDROCHLORIDE PRN MG: 10 TABLET, FILM COATED ORAL at 21:07

## 2017-07-09 RX ADMIN — ANALGESIC BALM SCH APPLIC: 1.74; 4.06 OINTMENT TOPICAL at 21:08

## 2017-07-09 RX ADMIN — PANTOPRAZOLE SODIUM SCH MG: 40 TABLET, DELAYED RELEASE ORAL at 09:52

## 2017-07-09 RX ADMIN — HYDROXYZINE PAMOATE PRN MG: 50 CAPSULE ORAL at 09:54

## 2017-07-09 RX ADMIN — CYCLOBENZAPRINE HYDROCHLORIDE PRN MG: 10 TABLET, FILM COATED ORAL at 09:54

## 2017-07-09 RX ADMIN — Medication SCH MG: at 21:06

## 2017-07-09 RX ADMIN — MIRTAZAPINE SCH MG: 15 TABLET, FILM COATED ORAL at 21:08

## 2017-07-09 RX ADMIN — ACETAMINOPHEN PRN MG: 325 TABLET ORAL at 09:54

## 2017-07-09 RX ADMIN — IBUPROFEN PRN MG: 600 TABLET, FILM COATED ORAL at 21:07

## 2017-07-09 RX ADMIN — ANALGESIC BALM SCH: 1.74; 4.06 OINTMENT TOPICAL at 09:53

## 2017-07-09 RX ADMIN — LISINOPRIL SCH MG: 20 TABLET ORAL at 09:52

## 2017-07-09 RX ADMIN — Medication SCH TAB: at 09:51

## 2017-07-10 RX ADMIN — MIRTAZAPINE SCH MG: 15 TABLET, FILM COATED ORAL at 21:18

## 2017-07-10 RX ADMIN — ANALGESIC BALM SCH APPLIC: 1.74; 4.06 OINTMENT TOPICAL at 21:18

## 2017-07-10 RX ADMIN — NICOTINE SCH: 14 PATCH, EXTENDED RELEASE TRANSDERMAL at 10:00

## 2017-07-10 RX ADMIN — SERTRALINE HYDROCHLORIDE SCH MG: 50 TABLET ORAL at 09:57

## 2017-07-10 RX ADMIN — Medication SCH TAB: at 09:57

## 2017-07-10 RX ADMIN — PANTOPRAZOLE SODIUM SCH MG: 40 TABLET, DELAYED RELEASE ORAL at 09:58

## 2017-07-10 RX ADMIN — IBUPROFEN PRN MG: 600 TABLET, FILM COATED ORAL at 21:20

## 2017-07-10 RX ADMIN — CYCLOBENZAPRINE HYDROCHLORIDE PRN MG: 10 TABLET, FILM COATED ORAL at 09:59

## 2017-07-10 RX ADMIN — ANALGESIC BALM SCH: 1.74; 4.06 OINTMENT TOPICAL at 10:04

## 2017-07-10 RX ADMIN — LISINOPRIL SCH MG: 20 TABLET ORAL at 09:57

## 2017-07-10 RX ADMIN — IBUPROFEN PRN MG: 600 TABLET, FILM COATED ORAL at 09:59

## 2017-07-10 RX ADMIN — Medication SCH MG: at 21:21

## 2017-07-10 RX ADMIN — CYCLOBENZAPRINE HYDROCHLORIDE PRN MG: 10 TABLET, FILM COATED ORAL at 21:21

## 2017-07-11 RX ADMIN — ANALGESIC BALM SCH: 1.74; 4.06 OINTMENT TOPICAL at 09:53

## 2017-07-11 RX ADMIN — CYCLOBENZAPRINE HYDROCHLORIDE PRN MG: 10 TABLET, FILM COATED ORAL at 21:11

## 2017-07-11 RX ADMIN — IBUPROFEN PRN MG: 600 TABLET, FILM COATED ORAL at 09:41

## 2017-07-11 RX ADMIN — CYCLOBENZAPRINE HYDROCHLORIDE PRN MG: 10 TABLET, FILM COATED ORAL at 09:41

## 2017-07-11 RX ADMIN — SERTRALINE HYDROCHLORIDE SCH MG: 50 TABLET ORAL at 09:41

## 2017-07-11 RX ADMIN — ANALGESIC BALM SCH: 1.74; 4.06 OINTMENT TOPICAL at 21:10

## 2017-07-11 RX ADMIN — LISINOPRIL SCH MG: 20 TABLET ORAL at 09:41

## 2017-07-11 RX ADMIN — IBUPROFEN PRN MG: 600 TABLET, FILM COATED ORAL at 21:11

## 2017-07-11 RX ADMIN — HYDROXYZINE PAMOATE PRN MG: 50 CAPSULE ORAL at 09:50

## 2017-07-11 RX ADMIN — Medication SCH TAB: at 09:40

## 2017-07-11 RX ADMIN — Medication SCH MG: at 21:10

## 2017-07-11 RX ADMIN — MIRTAZAPINE SCH MG: 15 TABLET, FILM COATED ORAL at 21:10

## 2017-07-11 RX ADMIN — HYDROXYZINE PAMOATE PRN MG: 50 CAPSULE ORAL at 21:11

## 2017-07-11 RX ADMIN — PANTOPRAZOLE SODIUM SCH MG: 40 TABLET, DELAYED RELEASE ORAL at 09:41

## 2017-07-11 RX ADMIN — NICOTINE SCH: 14 PATCH, EXTENDED RELEASE TRANSDERMAL at 09:55

## 2017-07-12 RX ADMIN — IBUPROFEN PRN MG: 600 TABLET, FILM COATED ORAL at 09:46

## 2017-07-12 RX ADMIN — Medication SCH TAB: at 09:46

## 2017-07-12 RX ADMIN — ANALGESIC BALM SCH: 1.74; 4.06 OINTMENT TOPICAL at 09:49

## 2017-07-12 RX ADMIN — CYCLOBENZAPRINE HYDROCHLORIDE PRN MG: 10 TABLET, FILM COATED ORAL at 09:46

## 2017-07-12 RX ADMIN — HYDROXYZINE PAMOATE PRN MG: 50 CAPSULE ORAL at 21:10

## 2017-07-12 RX ADMIN — ANALGESIC BALM SCH APPLIC: 1.74; 4.06 OINTMENT TOPICAL at 21:09

## 2017-07-12 RX ADMIN — MIRTAZAPINE SCH MG: 15 TABLET, FILM COATED ORAL at 21:09

## 2017-07-12 RX ADMIN — LISINOPRIL SCH MG: 20 TABLET ORAL at 09:46

## 2017-07-12 RX ADMIN — CYCLOBENZAPRINE HYDROCHLORIDE PRN MG: 10 TABLET, FILM COATED ORAL at 21:10

## 2017-07-12 RX ADMIN — SERTRALINE HYDROCHLORIDE SCH MG: 50 TABLET ORAL at 09:46

## 2017-07-12 RX ADMIN — IBUPROFEN PRN MG: 600 TABLET, FILM COATED ORAL at 21:10

## 2017-07-12 RX ADMIN — NICOTINE SCH: 14 PATCH, EXTENDED RELEASE TRANSDERMAL at 09:49

## 2017-07-12 RX ADMIN — PANTOPRAZOLE SODIUM SCH MG: 40 TABLET, DELAYED RELEASE ORAL at 09:46

## 2017-07-12 RX ADMIN — Medication SCH MG: at 21:09

## 2017-07-13 RX ADMIN — ANALGESIC BALM SCH APPLIC: 1.74; 4.06 OINTMENT TOPICAL at 21:14

## 2017-07-13 RX ADMIN — IBUPROFEN PRN MG: 600 TABLET, FILM COATED ORAL at 21:12

## 2017-07-13 RX ADMIN — NICOTINE SCH: 14 PATCH, EXTENDED RELEASE TRANSDERMAL at 09:27

## 2017-07-13 RX ADMIN — ANALGESIC BALM SCH: 1.74; 4.06 OINTMENT TOPICAL at 09:26

## 2017-07-13 RX ADMIN — PANTOPRAZOLE SODIUM SCH MG: 40 TABLET, DELAYED RELEASE ORAL at 09:27

## 2017-07-13 RX ADMIN — Medication SCH TAB: at 09:27

## 2017-07-13 RX ADMIN — Medication SCH MG: at 21:13

## 2017-07-13 RX ADMIN — MIRTAZAPINE SCH MG: 15 TABLET, FILM COATED ORAL at 21:11

## 2017-07-13 RX ADMIN — HYDROXYZINE PAMOATE PRN MG: 50 CAPSULE ORAL at 09:28

## 2017-07-13 RX ADMIN — LISINOPRIL SCH MG: 20 TABLET ORAL at 09:27

## 2017-07-13 RX ADMIN — SERTRALINE HYDROCHLORIDE SCH MG: 50 TABLET ORAL at 09:27

## 2017-07-13 RX ADMIN — CYCLOBENZAPRINE HYDROCHLORIDE PRN MG: 10 TABLET, FILM COATED ORAL at 21:12

## 2017-07-14 RX ADMIN — MIRTAZAPINE SCH MG: 15 TABLET, FILM COATED ORAL at 21:23

## 2017-07-14 RX ADMIN — TOLNAFTATE SCH APPLIC: 10 CREAM TOPICAL at 21:30

## 2017-07-14 RX ADMIN — Medication SCH MG: at 21:23

## 2017-07-14 RX ADMIN — CYCLOBENZAPRINE HYDROCHLORIDE PRN MG: 10 TABLET, FILM COATED ORAL at 21:23

## 2017-07-14 RX ADMIN — HYDROXYZINE PAMOATE PRN MG: 50 CAPSULE ORAL at 09:43

## 2017-07-14 RX ADMIN — PANTOPRAZOLE SODIUM SCH MG: 40 TABLET, DELAYED RELEASE ORAL at 09:45

## 2017-07-14 RX ADMIN — SERTRALINE HYDROCHLORIDE SCH MG: 50 TABLET ORAL at 21:23

## 2017-07-14 RX ADMIN — Medication SCH TAB: at 09:44

## 2017-07-14 RX ADMIN — IBUPROFEN PRN MG: 600 TABLET, FILM COATED ORAL at 21:27

## 2017-07-14 RX ADMIN — SERTRALINE HYDROCHLORIDE SCH: 50 TABLET ORAL at 09:45

## 2017-07-14 RX ADMIN — LISINOPRIL SCH MG: 20 TABLET ORAL at 09:41

## 2017-07-14 RX ADMIN — IBUPROFEN PRN MG: 600 TABLET, FILM COATED ORAL at 09:41

## 2017-07-14 RX ADMIN — NICOTINE SCH: 14 PATCH, EXTENDED RELEASE TRANSDERMAL at 09:44

## 2017-07-14 RX ADMIN — ANALGESIC BALM SCH: 1.74; 4.06 OINTMENT TOPICAL at 21:29

## 2017-07-14 RX ADMIN — CYCLOBENZAPRINE HYDROCHLORIDE PRN MG: 10 TABLET, FILM COATED ORAL at 09:41

## 2017-07-14 RX ADMIN — ANALGESIC BALM SCH: 1.74; 4.06 OINTMENT TOPICAL at 09:44

## 2017-07-15 RX ADMIN — CYCLOBENZAPRINE HYDROCHLORIDE PRN MG: 10 TABLET, FILM COATED ORAL at 21:08

## 2017-07-15 RX ADMIN — ANALGESIC BALM SCH: 1.74; 4.06 OINTMENT TOPICAL at 21:07

## 2017-07-15 RX ADMIN — PANTOPRAZOLE SODIUM SCH MG: 40 TABLET, DELAYED RELEASE ORAL at 09:35

## 2017-07-15 RX ADMIN — Medication SCH MG: at 21:08

## 2017-07-15 RX ADMIN — MIRTAZAPINE SCH MG: 15 TABLET, FILM COATED ORAL at 21:07

## 2017-07-15 RX ADMIN — HYDROXYZINE PAMOATE PRN MG: 50 CAPSULE ORAL at 09:37

## 2017-07-15 RX ADMIN — SERTRALINE HYDROCHLORIDE SCH MG: 50 TABLET ORAL at 21:09

## 2017-07-15 RX ADMIN — LISINOPRIL SCH MG: 20 TABLET ORAL at 09:35

## 2017-07-15 RX ADMIN — TOLNAFTATE SCH: 10 CREAM TOPICAL at 09:36

## 2017-07-15 RX ADMIN — TOLNAFTATE SCH: 10 CREAM TOPICAL at 21:08

## 2017-07-15 RX ADMIN — NICOTINE SCH: 14 PATCH, EXTENDED RELEASE TRANSDERMAL at 09:36

## 2017-07-15 RX ADMIN — Medication SCH TAB: at 09:35

## 2017-07-15 RX ADMIN — HYDROXYZINE PAMOATE PRN MG: 50 CAPSULE ORAL at 19:58

## 2017-07-15 RX ADMIN — ANALGESIC BALM SCH: 1.74; 4.06 OINTMENT TOPICAL at 09:36

## 2017-07-15 RX ADMIN — ACETAMINOPHEN PRN MG: 325 TABLET ORAL at 09:37

## 2017-07-16 RX ADMIN — ANALGESIC BALM SCH: 1.74; 4.06 OINTMENT TOPICAL at 21:24

## 2017-07-16 RX ADMIN — TOLNAFTATE SCH: 10 CREAM TOPICAL at 09:56

## 2017-07-16 RX ADMIN — TOLNAFTATE SCH: 10 CREAM TOPICAL at 21:25

## 2017-07-16 RX ADMIN — SERTRALINE HYDROCHLORIDE SCH MG: 50 TABLET ORAL at 21:20

## 2017-07-16 RX ADMIN — MAGNESIUM HYDROXIDE PRN ML: 400 SUSPENSION ORAL at 09:57

## 2017-07-16 RX ADMIN — MIRTAZAPINE SCH MG: 15 TABLET, FILM COATED ORAL at 21:20

## 2017-07-16 RX ADMIN — PANTOPRAZOLE SODIUM SCH MG: 40 TABLET, DELAYED RELEASE ORAL at 09:55

## 2017-07-16 RX ADMIN — IBUPROFEN PRN MG: 600 TABLET, FILM COATED ORAL at 09:58

## 2017-07-16 RX ADMIN — CYCLOBENZAPRINE HYDROCHLORIDE PRN MG: 10 TABLET, FILM COATED ORAL at 09:57

## 2017-07-16 RX ADMIN — Medication SCH MG: at 21:20

## 2017-07-16 RX ADMIN — LISINOPRIL SCH MG: 20 TABLET ORAL at 09:55

## 2017-07-16 RX ADMIN — NICOTINE SCH: 14 PATCH, EXTENDED RELEASE TRANSDERMAL at 09:55

## 2017-07-16 RX ADMIN — HYDROXYZINE PAMOATE PRN MG: 50 CAPSULE ORAL at 09:57

## 2017-07-16 RX ADMIN — ANALGESIC BALM SCH: 1.74; 4.06 OINTMENT TOPICAL at 09:55

## 2017-07-16 RX ADMIN — CYCLOBENZAPRINE HYDROCHLORIDE PRN MG: 10 TABLET, FILM COATED ORAL at 21:22

## 2017-07-16 RX ADMIN — Medication SCH TAB: at 09:55

## 2017-07-16 RX ADMIN — ACETAMINOPHEN PRN MG: 325 TABLET ORAL at 21:22

## 2017-07-17 RX ADMIN — SERTRALINE HYDROCHLORIDE SCH MG: 50 TABLET ORAL at 21:22

## 2017-07-17 RX ADMIN — Medication SCH MG: at 21:22

## 2017-07-17 RX ADMIN — ANALGESIC BALM SCH: 1.74; 4.06 OINTMENT TOPICAL at 09:45

## 2017-07-17 RX ADMIN — IBUPROFEN PRN MG: 600 TABLET, FILM COATED ORAL at 09:43

## 2017-07-17 RX ADMIN — Medication SCH TAB: at 09:43

## 2017-07-17 RX ADMIN — HYDROXYZINE PAMOATE PRN MG: 50 CAPSULE ORAL at 20:39

## 2017-07-17 RX ADMIN — MIRTAZAPINE SCH MG: 15 TABLET, FILM COATED ORAL at 21:22

## 2017-07-17 RX ADMIN — MAGNESIUM HYDROXIDE PRN ML: 400 SUSPENSION ORAL at 06:37

## 2017-07-17 RX ADMIN — NICOTINE SCH: 14 PATCH, EXTENDED RELEASE TRANSDERMAL at 09:45

## 2017-07-17 RX ADMIN — CYCLOBENZAPRINE HYDROCHLORIDE PRN MG: 10 TABLET, FILM COATED ORAL at 09:43

## 2017-07-17 RX ADMIN — TOLNAFTATE SCH: 10 CREAM TOPICAL at 09:45

## 2017-07-17 RX ADMIN — CYCLOBENZAPRINE HYDROCHLORIDE PRN MG: 10 TABLET, FILM COATED ORAL at 21:22

## 2017-07-17 RX ADMIN — TOLNAFTATE SCH: 10 CREAM TOPICAL at 21:24

## 2017-07-17 RX ADMIN — PANTOPRAZOLE SODIUM SCH MG: 40 TABLET, DELAYED RELEASE ORAL at 09:43

## 2017-07-17 RX ADMIN — LISINOPRIL SCH MG: 20 TABLET ORAL at 09:43

## 2017-07-17 RX ADMIN — ANALGESIC BALM SCH: 1.74; 4.06 OINTMENT TOPICAL at 21:24

## 2017-07-17 RX ADMIN — HYDROXYZINE PAMOATE PRN MG: 50 CAPSULE ORAL at 06:37

## 2017-07-17 NOTE — PN
Psychiatric Progress Note


Vital Signs: 


 Vital Signs











 Period  Temp  Pulse  Resp  BP Sys/Santana  Pulse Ox


 


 Last 24 Hr  97.8 F    18-20  125-131/76-79  











Date of Session: 07/17/17


Chief Complaint:: Discharge Note


HPI: Patient addressing Alcohol Dependence comorbid with Nicotine Dependence 

and Anxiety Disorder


ROS: HTN, GERD were medically addressed


Current Medications: 


Active Medications











Generic Name Dose Route Start Last Admin





  Trade Name Freq  PRN Reason Stop Dose Admin


 


Acetaminophen  650 mg 06/20/17 13:23 07/16/17 21:22





  Tylenol -  PO   650 mg





  Q4H PRN   Administration





  PAIN   


 


Al Hydroxide/Mg Hydroxide  30 ml 06/20/17 13:23 07/02/17 19:13





  Mylanta Oral Suspension -  PO   30 ml





  Q6H PRN   Administration





  DYSPEPSIA   


 


Colloidal Oatmeal  1 applic 06/28/17 09:45 06/28/17 10:05





  Aveeno Soap -  TP   1 bar





  DAILY PRN   Administration





  HYGEINE   


 


Cyclobenzaprine HCl  10 mg 06/22/17 15:52 07/16/17 21:22





  Flexeril -  PO   10 mg





  TID PRN   Administration





  MUSCLE SPASMS   


 


Diphenhydramine HCl  50 mg 06/20/17 13:23 07/13/17 21:12





  Benadryl -  PO   50 mg





  HSMR1 PRN   Administration





  INSOMNIA   


 


Eucalyptus/Menthol/Phenol/Sorbitol  1 each 06/20/17 13:23  





  Cepastat Lozenge -  MM   





  Q4H PRN   





  SORE THROAT   


 


Guaifenesin  10 ml 06/20/17 13:23  





  Robitussin Dm -  PO   





  Q6H PRN   





  COUGH   


 


Hydroxyzine Pamoate  50 mg 06/20/17 13:23 07/17/17 06:37





  Vistaril -  PO   50 mg





  Q4H PRN   Administration





  AGITATION   


 


Ibuprofen  600 mg 06/22/17 15:51 07/16/17 09:58





  Motrin -  PO   600 mg





  Q6H PRN   Administration





  PAIN   


 


Lisinopril  20 mg 06/21/17 10:00 07/16/17 09:55





  Prinivil  PO   20 mg





  DAILY ALEX   Administration


 


Loperamide HCl  4 mg 06/20/17 13:23  





  Imodium -  PO   





  Q6H PRN   





  DIARRHEA   


 


Magnesium Citrate  300 ml 06/20/17 13:23  





  Citroma -  PO   





  Q48H PRN   





  CONSTIPATION   


 


Magnesium Hydroxide  30 ml 06/20/17 13:23 07/17/17 06:37





  Milk Of Magnesia -  PO   30 ml





  DAILY PRN   Administration





  CONSTIPATION   


 


Methyl Salicylate  1 applic 06/27/17 10:00 07/16/17 21:24





  Tj-Najera -  TP   Not Given





  BID ALEX   


 


Mirtazapine  15 mg 06/20/17 22:00 07/16/17 21:20





  Remeron -  PO   15 mg





  HS ALEX   Administration


 


Nicotine  14 mg 06/20/17 16:00 07/16/17 09:55





  Nicoderm Patch -  TD   Not Given





  DAILY ALEX   


 


Nicotine Polacrilex  2 mg 06/20/17 15:49 06/21/17 10:33





  Nicorette Gum -  BUC   2 mg





  Q2H PRN   Administration





  NICOTINE REPLACEMENT RX   


 


Pantoprazole Sodium  40 mg 07/03/17 10:00 07/16/17 09:55





  Protonix -  PO   40 mg





  DAILY ALEX   Administration


 


Prenatal Multivit/Folic Acid/Iron  1 tab 06/21/17 10:00 07/16/17 09:55





  Prenatal Vitamins (Sjr) -  PO   1 tab





  DAILY ALEX   Administration


 


Pseudoephedrine/Triprolidine  1 combo 06/20/17 13:23  





  Actifed -  PO   





  TID PRN   





  NASAL CONGESTION   


 


Sertraline HCl  200 mg 07/14/17 22:00 07/16/17 21:20





  Zoloft -  PO   200 mg





  HS ALEX   Administration


 


Thiamine HCl  100 mg 06/20/17 22:00 07/16/17 21:20





  Vitamin B1 -  PO   100 mg





  HS ALEX   Administration


 


Tolnaftate  1 applic 07/14/17 22:00 07/16/17 21:25





  Tinactin 1% Cream -  TP   Not Given





  BID ALEX   











Current Side Effect: No


Lab tests ordered: Yes


Lab tests reviewed: Yes


Provider note:: Patient will complete this program on 7/18/17. He has met his 

treatment goals and will continue to address his issues in outpatient treatment 

at [facility] at [address] [place] [address]. Told writer from his 

participation in this program, he has learned that he has to attend meetings 

and stay busy in order to have a shot at abstinence. He responded well to 

Zoloft 200 mg po HS and Remeron 15 mg po HS. Scripts for 30 days supply of 

medications will be electronically transmitted to [facility] at [address] [place] [address]. He is stable for discharge on 7/18/17


Total face to face time:: 35





Mental Status Exam





- Mental Status Exam


Alert and Oriented to: Time, Place, Person


Cognitive Function: Fair


Patient Appearance: Well Groomed


Mood: Hopeful, Euthymic


Affect: Appropriate


Patient Behavior: Cooperative


Speech Pattern: Clear


Voice Loudness: Normal


Thought Process: Intact, Goal Oriented


Thought Disorder: Not Present


Hallucinations: Denies


Suicidal Ideation: Denies


Homicidal Ideation: Denies


Insight/Judgement: Fair


Sleep: Fair


Appetite: Fair


Muscle strength/Tone: Normal


Gait/Station: Normal





Psychiatric Treatment Plan





- Problem List


(1) Alcohol dependence


Current Visit: No   





(2) Nicotine dependence


Current Visit: Yes   





(3) Anxiety disorder


Current Visit: Yes   





(4) MARCO ANTONIO (generalized anxiety disorder)


Current Visit: Yes





(5) Hypertension


Current Visit: Yes   





(6) GERD (gastroesophageal reflux disease)


Current Visit: Yes   


Initial treatment plan: Patient will be discharged tomorrow and referred to 

[facility] for [facility] treatment

## 2017-07-18 VITALS — SYSTOLIC BLOOD PRESSURE: 131 MMHG | HEART RATE: 94 BPM | TEMPERATURE: 98.6 F | DIASTOLIC BLOOD PRESSURE: 79 MMHG

## 2017-07-18 PROCEDURE — HZ42ZZZ GROUP COUNSELING FOR SUBSTANCE ABUSE TREATMENT, COGNITIVE-BEHAVIORAL: ICD-10-PCS | Performed by: PSYCHIATRY & NEUROLOGY

## 2017-07-18 RX ADMIN — LISINOPRIL SCH MG: 20 TABLET ORAL at 09:31

## 2017-07-18 RX ADMIN — TOLNAFTATE SCH: 10 CREAM TOPICAL at 09:58

## 2017-07-18 RX ADMIN — PANTOPRAZOLE SODIUM SCH MG: 40 TABLET, DELAYED RELEASE ORAL at 09:31

## 2017-07-18 RX ADMIN — Medication SCH TAB: at 09:31

## 2017-07-18 RX ADMIN — NICOTINE SCH: 14 PATCH, EXTENDED RELEASE TRANSDERMAL at 09:32

## 2017-07-18 RX ADMIN — ANALGESIC BALM SCH: 1.74; 4.06 OINTMENT TOPICAL at 09:32

## 2018-08-20 ENCOUNTER — HOSPITAL ENCOUNTER (INPATIENT)
Dept: HOSPITAL 74 - YASAS | Age: 53
LOS: 5 days | Discharge: HOME | DRG: 897 | End: 2018-08-25
Attending: SURGERY | Admitting: SURGERY
Payer: COMMERCIAL

## 2018-08-20 VITALS — BODY MASS INDEX: 28.2 KG/M2

## 2018-08-20 DIAGNOSIS — F19.230: Primary | ICD-10-CM

## 2018-08-20 DIAGNOSIS — F32.9: ICD-10-CM

## 2018-08-20 DIAGNOSIS — R00.0: ICD-10-CM

## 2018-08-20 DIAGNOSIS — Z87.891: ICD-10-CM

## 2018-08-20 DIAGNOSIS — F41.9: ICD-10-CM

## 2018-08-20 DIAGNOSIS — F17.210: ICD-10-CM

## 2018-08-20 DIAGNOSIS — R94.5: ICD-10-CM

## 2018-08-20 DIAGNOSIS — K21.9: ICD-10-CM

## 2018-08-20 DIAGNOSIS — Z91.14: ICD-10-CM

## 2018-08-20 DIAGNOSIS — F10.230: ICD-10-CM

## 2018-08-20 DIAGNOSIS — E66.9: ICD-10-CM

## 2018-08-20 DIAGNOSIS — R74.0: ICD-10-CM

## 2018-08-20 DIAGNOSIS — I10: ICD-10-CM

## 2018-08-20 PROCEDURE — HZ2ZZZZ DETOXIFICATION SERVICES FOR SUBSTANCE ABUSE TREATMENT: ICD-10-PCS | Performed by: SURGERY

## 2018-08-20 RX ADMIN — Medication SCH MG: at 23:02

## 2018-08-20 RX ADMIN — Medication PRN MG: at 23:04

## 2018-08-20 NOTE — HP
CIWA Score





- CIWA Score


Nausea/Vomitin-Mild Nausea/No Vomiting


Muscle Tremors: 4-Moderate,w/Arms Extend


Anxiety: 4-Mod. Anxious/Guarded


Agitation: 4-Moderately Restless


Paroxysmal Sweats: 3


Orientation: 0-Oriented


Tacttile Disturbances: 3-Moderate Itch/Numb/Burn


Auditory Disturbances: 0-None


Visual Disturbances: 0-None


Headache: 3-Moderate


CIWA-Ar Total Score: 22





Admission ROS [facility]





- HPI


Chief Complaint: 





c/o withdrawal sx's. seeking detox from alcohol


Allergies/Adverse Reactions: 


 Allergies











Allergy/AdvReac Type Severity Reaction Status Date / Time


 


No Known Allergies Allergy   Verified 17 13:03











History of Present Illness: 





53 Y.O. MALE WITH ALCOHOLISM HERE FOR DETOX. CLIENT IS KNOWN TO THIS PROGRAM. 

LAST HERE 1 YEAR AGO. HE IS REFERRED TO [facility] BY HIS SHELTER [facility]. DENIES ANY 

SIGNIFICANT PERIOD OF CLEAN TIME, SEIZURES, AVH, SI/HI. UTOX + BZO. CLIENT 

REPORTS TAKING A PILL FROM A FRIEND.


Exam Limitations: No Limitations





- Ebola screening


Have you traveled outside of the country in the last 21 days: No (N)


Have you had contact with anyone from an Ebola affected area: No


Have you been sick,other than usual withdrawal symptoms: No


Do you have a fever: No





- Review of Systems


Constitutional: Chills, Loss of Appetite, Malaise, Night Sweats, Changes in 

sleep


EENT: reports: No Symptoms Reported


Respiratory: reports: No Symptoms reported


Cardiac: reports: No Symptoms Reported


GI: reports: Diarrhea, Nausea, Poor Appetite, Vomiting, Other (GERD)


: reports: No Symptoms Reported


Musculoskeletal: reports: Joint Pain


Integumentary: reports: Rash (TO BOTH ARMS  CHRONIC)


Neuro: reports: No Symptoms reported


Endocrine: reports: No Symptoms Reported


Hematology: reports: No Symptoms Reported


Psychiatric: reports: Anxious, Depressed


Other Systems: Reviewed and Negative





Patient History





- Patient Medical History


Hx Anemia: No


Hx Asthma: No


Hx Chronic Obstructive Pulmonary Disease (COPD): No


Hx Cancer: No


Hx Cardiac Disorders: No


Hx Congestive Heart Failure: No


Hx Hypertension: Yes (NON COMPLIANT WWITH MED. DOES NOT RECALL)


Hx Hypercholesterolemia: No


Hx Pacemaker: No


HX Cerebrovascular Accident: No


Hx Seizures: No


Hx Diabetes: No


Hx Gastrointestinal Disorders: Yes (GERD/PROTONIX)


Hx Liver Disease: No


Hx Genitourinary Disorders: No


Hx Sexually Transmitted Disorders: No


Hx Renal Disease (ESRD): No


Hx Thyroid Disease: No


Hx Human Immunodeficiency Virus (HIV): No


Hx Hepatitis C: No


Hx Depression: Yes


Hx Suicide Attempt: No


Hx Schizophrenia: No


Other Medical History: ANXIETY/ PTSD





- Patient Surgical History


Past Surgical History: No


Hx Neurologic Surgery: No


Hx Cataract Extraction: No


Hx Cardiac Surgery: No


Hx Lung Surgery: No


Hx Breast Surgery: No


Hx Breast Biopsy: No


Hx Abdominal Surgery: Yes


Hx Appendectomy: No


Hx Cholecystectomy: No


Hx Genitourinary Surgery: No


Hx  Section: No


Hx Orthopedic Surgery: Yes (right tight abd had a colonostomy bg for 2 years 

prior to closure )


Anesthesia Reaction: No





- PPD History


Previous Implant?: Yes


Documented Results: Negative w/o proof


Implanted On Prior [facility] Admission?: Yes


Date: 17


Results: 0 MM


PPD to be Administered?: Yes





- Smoking Cessation


Smoking history: Former smoker


Have you smoked in the past 12 months: Yes


Aproximately how many cigarettes per day: 3


Cigars Per Day: 0


Hx Chewing Tobacco Use: No


Initiated information on smoking cessation: Yes


'Breaking Loose' booklet given: 18





- Substance & Tx. History


Hx Alcohol Use: Yes


Hx Substance Use: Yes


Substance Use Type: Alcohol


Hx Substance Use Treatment: Yes ([facility])





- Substances Abused


  ** Alcohol


Route: Oral


Frequency: Daily


Amount used: Vodka 3 pints, Beer 2 x 24oz


Age of first use: 14


Date of Last Use: 18





Family Disease History





- Family Disease History


Family Disease History: CA: Grandparent, Other: Father (alcohol,), 

Mother (alcohol,)





Admission Physical Exam [facility]





- Vital Signs


Vital Signs: 


 Vital Signs - 24 hr











  18





  18:39


 


Temperature 97.6 F


 


Pulse Rate 103 H


 


Respiratory 20





Rate 


 


Blood Pressure 159/98














- Physical


General Appearance: Yes: Appropriately Dressed, Mild Distress, Alcohol on Breath

, Obese, Tremorous (FELT), Anxious


HEENTM: Yes: EOMI, Normocephalic, Normal Voice, KIKI, Pharynx Normal


Respiratory: Yes: Chest Non-Tender, Lungs Clear, Normal Breath Sounds, No 

Respiratory Distress, No Accessory Muscle Use


Neck: Yes: No masses,lesions,Nodules, Supple, Trachea in good position


Breast: Yes: Breast Exam Deferred


Cardiology: Yes: Regular Rhythm, S1, S2, Tachycardia


Abdominal: Yes: Non Tender, Soft, Protuberent


Genitourinary: Yes: Other (NO C/O)


Back: Yes: Normal Inspection


Musculoskeletal: Yes: full range of Motion, Gait Steady


Extremities: Yes: Normal Range of Motion, Non-Tender, Tremors (FELT)


Neurological: Yes: Fully Oriented, Alert, Motor Strength 5/5, Depressed Affect


Integumentary: Yes: Dry, Warm


Lymphatic: Yes: Within Normal Limits





- Diagnostic


(1) Alcohol dependence with uncomplicated withdrawal


Current Visit: Yes   Status: Acute   





(2) Depression


Current Visit: Yes   Status: Suspected   





(3) GERD (gastroesophageal reflux disease)


Current Visit: Yes   Status: Chronic   


Qualifiers: 


   Esophagitis presence: without esophagitis   Qualified Code(s): K21.9 - Gastro

-esophageal reflux disease without esophagitis   





(4) Hypertension


Current Visit: Yes   Status: Chronic   


Qualifiers: 


   Hypertension type: essential hypertension   Qualified Code(s): I10 - 

Essential (primary) hypertension   





(5) Nicotine dependence


Current Visit: Yes   Status: Chronic   


Qualifiers: 


   Nicotine product type: cigarettes   Substance use status: uncomplicated   

Qualified Code(s): F17.210 - Nicotine dependence, cigarettes, uncomplicated   





Cleared for Admission [facility]





- Detox or Rehab


[facility] Level of Care: Medically Managed


Detox Regimen/Protocol: Librium


Claeared for Rehab Admission: No





[facility] Breath Alcohol Content


Breath Alcohol Content: 0.140





Urine Drug Screen





- Results


Drug Screen Negative: No


Urine Drug Screen Results: BZO-Benzodiazepines

## 2018-08-21 LAB
ALBUMIN SERPL-MCNC: 3.1 G/DL (ref 3.4–5)
ALP SERPL-CCNC: 95 U/L (ref 45–117)
ALT SERPL-CCNC: 89 U/L (ref 12–78)
ANION GAP SERPL CALC-SCNC: 12 MMOL/L (ref 8–16)
AST SERPL-CCNC: 157 U/L (ref 15–37)
BILIRUB SERPL-MCNC: 0.7 MG/DL (ref 0.2–1)
BUN SERPL-MCNC: 8 MG/DL (ref 7–18)
CALCIUM SERPL-MCNC: 8.7 MG/DL (ref 8.5–10.1)
CHLORIDE SERPL-SCNC: 101 MMOL/L (ref 98–107)
CO2 SERPL-SCNC: 28 MMOL/L (ref 21–32)
CREAT SERPL-MCNC: 1 MG/DL (ref 0.7–1.3)
DEPRECATED RDW RBC AUTO: 14.8 % (ref 11.9–15.9)
GLUCOSE SERPL-MCNC: 74 MG/DL (ref 74–106)
HCT VFR BLD CALC: 40.3 % (ref 35.4–49)
HGB BLD-MCNC: 13.6 GM/DL (ref 11.7–16.9)
MCH RBC QN AUTO: 29.6 PG (ref 25.7–33.7)
MCHC RBC AUTO-ENTMCNC: 33.6 G/DL (ref 32–35.9)
MCV RBC: 88 FL (ref 80–96)
PLATELET # BLD AUTO: 321 K/MM3 (ref 134–434)
PMV BLD: 8.2 FL (ref 7.5–11.1)
POTASSIUM SERPLBLD-SCNC: 4 MMOL/L (ref 3.5–5.1)
PROT SERPL-MCNC: 6.9 G/DL (ref 6.4–8.2)
RBC # BLD AUTO: 4.58 M/MM3 (ref 4–5.6)
SODIUM SERPL-SCNC: 141 MMOL/L (ref 136–145)
WBC # BLD AUTO: 4.7 K/MM3 (ref 4–10)

## 2018-08-21 RX ADMIN — PANTOPRAZOLE SODIUM SCH MG: 40 TABLET, DELAYED RELEASE ORAL at 10:15

## 2018-08-21 RX ADMIN — Medication SCH TAB: at 10:15

## 2018-08-21 RX ADMIN — NICOTINE SCH: 14 PATCH, EXTENDED RELEASE TRANSDERMAL at 10:17

## 2018-08-21 RX ADMIN — SERTRALINE HYDROCHLORIDE SCH MG: 50 TABLET ORAL at 22:14

## 2018-08-21 RX ADMIN — MIRTAZAPINE SCH MG: 15 TABLET, FILM COATED ORAL at 22:14

## 2018-08-21 RX ADMIN — Medication SCH MG: at 22:14

## 2018-08-21 RX ADMIN — LISINOPRIL SCH MG: 20 TABLET ORAL at 10:15

## 2018-08-21 NOTE — EKG
Test Reason : 

Blood Pressure : ***/*** mmHG

Vent. Rate : 091 BPM     Atrial Rate : 091 BPM

   P-R Int : 198 ms          QRS Dur : 090 ms

    QT Int : 398 ms       P-R-T Axes : 053 007 034 degrees

   QTc Int : 489 ms

 

NORMAL SINUS RHYTHM

POSSIBLE LEFT ATRIAL ENLARGEMENT

PROLONGED QT

ABNORMAL ECG

WHEN COMPARED WITH ECG OF 20-JUN-2017 20:29,

NO SIGNIFICANT CHANGE WAS FOUND

Confirmed by Apolinar Mooney MD ([id number]) on 8/21/2018 1:16:02 PM

 

Referred By:             Confirmed By:Apolinar Mooney MD

## 2018-08-21 NOTE — PN
S CIWA





- CIWA Score


Nausea/Vomitin-No Nausea/No Vomiting


Muscle Tremors: 4-Moderate,w/Arms Extend


Anxiety: 4-Mod. Anxious/Guarded


Agitation: 4-Moderately Restless


Paroxysmal Sweats: 1-Minimal Palms Moist


Orientation: 0-Oriented


Tacttile Disturbances: 0-None


Auditory Disturbances: 0-None


Visual Disturbances: 0-None


Headache: 0-None Present


CIWA-Ar Total Score: 13





BHS Progress Note (SOAP)


Subjective: 





ANXIETY,SWEATS,TREMORS,INTERMITTENT SLEEP.


Objective: 





18 10:41


 Vital Signs











  18





  03:30 06:06 06:30


 


Temperature  97.6 F 


 


Pulse Rate  100 H 


 


Respiratory 18 18 18





Rate   


 


Blood Pressure  120/75 














  18





  09:19


 


Temperature 95.8 F L


 


Pulse Rate 87


 


Respiratory 16





Rate 


 


Blood Pressure 126/77








 Laboratory Tests











  18





  07:30


 


WBC  4.7


 


RBC  4.58


 


Hgb  13.6


 


Hct  40.3


 


MCV  88.0


 


MCH  29.6


 


MCHC  33.6


 


RDW  14.8


 


Plt Count  321


 


MPV  8.2








OTHER LABS PENDING


Assessment: 





18 10:41


WITHDRAWAL SX


Plan: 





CONTINUE DETOX
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
## 2018-08-22 RX ADMIN — LISINOPRIL SCH MG: 20 TABLET ORAL at 10:26

## 2018-08-22 RX ADMIN — NICOTINE SCH: 14 PATCH, EXTENDED RELEASE TRANSDERMAL at 10:26

## 2018-08-22 RX ADMIN — SERTRALINE HYDROCHLORIDE SCH MG: 50 TABLET ORAL at 22:19

## 2018-08-22 RX ADMIN — Medication SCH TAB: at 10:26

## 2018-08-22 RX ADMIN — Medication SCH MG: at 22:18

## 2018-08-22 RX ADMIN — MIRTAZAPINE SCH MG: 15 TABLET, FILM COATED ORAL at 22:18

## 2018-08-22 RX ADMIN — PANTOPRAZOLE SODIUM SCH MG: 40 TABLET, DELAYED RELEASE ORAL at 10:26

## 2018-08-22 RX ADMIN — HYDROXYZINE PAMOATE PRN MG: 50 CAPSULE ORAL at 18:14

## 2018-08-22 NOTE — PN
Psychiatric Progress Note


Vital Signs: 


 Vital Signs











 Period  Temp  Pulse  Resp  BP Sys/Santana  Pulse Ox


 


 Last 24 Hr  96.3 F-99.2 F  68-94  18-18  /62-81  











Date of Session: 08/22/18


Chief Complaint:: 'I have anxiety."


HPI: Pt. admitted to  for alcohol dependence.


ROS: hypertension, Gerd


Current Medications: 


Active Medications











Generic Name Dose Route Start Last Admin





  Trade Name Freq  PRN Reason Stop Dose Admin


 


Acetaminophen  650 mg  08/20/18 21:55  





  Tylenol -  PO   





  Q4H PRN   





  FEVER   





     





     





     


 


Al Hydroxide/Mg Hydroxide  30 ml  08/20/18 21:55  





  Mylanta Oral Suspension -  PO   





  Q6H PRN   





  DYSPEPSIA   





     





     





     


 


Chlordiazepoxide HCl  15 mg  08/22/18 23:00  





  Librium -  PO  08/23/18 17:01  





  [id number] ALEX   





     





     





     





     


 


Chlordiazepoxide HCl  25 mg  08/20/18 21:55  





  Librium -  PO  08/23/18 21:54  





  Q4H PRN   





  WITHDRAWAL(CONT SUBST)   





     





     





     


 


Chlordiazepoxide HCl  10 mg  08/23/18 23:00  





  Librium -  PO  08/24/18 17:01  





  [id number] ALEX   





     





     





     





     


 


Eucalyptus/Menthol/Phenol/Sorbitol  1 each  08/20/18 21:55  





  Cepastat Lozenge -  MM   





  Q4H PRN   





  SORE THROAT   





     





     





     


 


Guaifenesin  10 ml  08/20/18 21:55  





  Robitussin Dm -  PO   





  Q6H PRN   





  COUGH   





     





     





     


 


Hydrocortisone  1 applic  08/20/18 22:01  





  Hytone 1% Cream -  TP   





  QID PRN   





  RASH/ PRURITIS   





     





     





     


 


Hydroxyzine Pamoate  50 mg  08/20/18 21:55  





  Vistaril -  PO   





  Q4H PRN   





  AGITATION   





     





     





     


 


Ibuprofen  400 mg  08/20/18 21:55  





  Motrin -  PO   





  Q6H PRN   





  PAIN LEVEL 4-6   





     





     





     


 


Lisinopril  20 mg  08/21/18 10:00  08/22/18 10:26





  Prinivil  PO   20 mg





  DAILY ALEX   Administration





     





     





     





     


 


Loperamide HCl  4 mg  08/20/18 21:55  





  Imodium -  PO   





  Q6H PRN   





  DIARRHEA   





     





     





     


 


Magnesium Citrate  300 ml  08/20/18 21:55  





  Citroma -  PO   





  Q48H PRN   





  CONSTIPATION   





     





     





     


 


Magnesium Hydroxide  30 ml  08/20/18 21:55  





  Milk Of Magnesia -  PO   





  DAILY PRN   





  CONSTIPATION   





     





     





     


 


Melatonin  5 mg  08/20/18 22:00  08/20/18 23:04





  Melatonin  PO   5 mg





  HS PRN   Administration





  INSOMNIA   





     





     





     


 


Mirtazapine  15 mg  08/21/18 22:00  08/21/18 22:14





  Remeron -  PO   15 mg





  HS ALEX   Administration





     





     





     





     


 


Nicotine  14 mg  08/21/18 10:00  08/22/18 10:26





  Nicoderm Patch -  TD   Not Given





  DAILY ALEX   





     





     





     





     


 


Nicotine Polacrilex  2 mg  08/20/18 21:55  





  Nicorette Gum -  BC   





  Q2H PRN   





  NICOTINE REPLACEMENT RX   





     





     





     


 


Pantoprazole Sodium  40 mg  08/21/18 10:00  08/22/18 10:26





  Protonix -  PO   40 mg





  DAILY ALEX   Administration





     





     





     





     


 


Prenatal Multivit/Folic Acid/Iron  1 tab  08/21/18 10:00  08/22/18 10:26





  Prenatal Vitamins (Sjr) -  PO   1 tab





  DAILY ALEX   Administration





     





     





     





     


 


Pseudoephedrine/Triprolidine  1 combo  08/20/18 21:55  





  Actifed -  PO   





  TID PRN   





  NASAL CONGESTION   





     





     





     


 


Sertraline HCl  50 mg  08/21/18 22:00  08/21/18 22:14





  Zoloft -  PO   50 mg





  HS ALEX   Administration





     





     





     





     


 


Thiamine HCl  100 mg  08/20/18 22:00  08/21/18 22:14





  Vitamin B1 -  PO   100 mg





  HS ALEX   Administration





     





     





     





     











Medication(s) Change(s): No.


Current Side Effect: No


Lab tests ordered: No


Lab tests reviewed: Yes


Provider note:: Writer spoke to patient concerning his request for a 

psychiatric follow up. Pt. complaining of anxiety today. Stated the vistaril 

was not effective. Writer recommended patient to accept vistaril before anxiety 

worsens as oppose to asking for medication when his anxiety is at its peak. Pt. 

also encouraged to utilize his coping mechanisms. Pt. satisifed and receptive 

to feedback.


Total face to face time:: 15





Mental Status Exam





- Mental Status Exam


Alert and Oriented to: Time, Place, Person


Cognitive Function: Good


Patient Appearance: Well Groomed


Mood: Hopeful


Affect: Mood Congruent


Patient Behavior: Appropriate, Cooperative


Speech Pattern: Clear, Appropriate


Voice Loudness: Normal


Thought Process: Intact, Goal Oriented


Thought Disorder: Not Present


Hallucinations: Denies


Suicidal Ideation: Denies


Homicidal Ideation: Denies


Insight/Judgement: Poor


Sleep: Fair


Appetite: Fair


Muscle strength/Tone: Normal


Gait/Station: Normal





Psychiatric Treatment Plan





- Problem List


(1) Alcohol dependence with uncomplicated withdrawal


Current Visit: Yes   





(2) Nicotine dependence


Current Visit: Yes   


Qualifiers: 


   Nicotine product type: cigarettes   Substance use status: in withdrawal   

Qualified Code(s): F17.213 - Nicotine dependence, cigarettes, with withdrawal   





(3) Anxiety disorder


Current Visit: Yes

## 2018-08-22 NOTE — PN
S CIWA





- CIWA Score


Nausea/Vomitin-No Nausea/No Vomiting


Muscle Tremors: 4-Moderate,w/Arms Extend


Anxiety: 4-Mod. Anxious/Guarded


Agitation: 3


Paroxysmal Sweats: 1-Minimal Palms Moist


Orientation: 0-Oriented


Tacttile Disturbances: 0-None


Auditory Disturbances: 0-None


Visual Disturbances: 0-None


Headache: 0-None Present


CIWA-Ar Total Score: 12





BHS Progress Note (SOAP)


Subjective: 





PT C/O ANXIETY,TREMORS AND ANGRY-BUT DON'T KNOW WHY. WILL LIKE TO SE THE PSYCH.


Objective: 





18 11:33


 Vital Signs











  18





  04:10 05:52 06:30


 


Temperature  97.1 F L 


 


Pulse Rate  86 


 


Respiratory 18 18 18





Rate   


 


Blood Pressure  142/81 














  18





  10:46


 


Temperature 96.3 F L


 


Pulse Rate 68


 


Respiratory 18





Rate 


 


Blood Pressure 100/64








 Laboratory Tests











  18





  07:30 07:30 07:30


 


WBC  4.7  


 


RBC  4.58  


 


Hgb  13.6  


 


Hct  40.3  


 


MCV  88.0  


 


MCH  29.6  


 


MCHC  33.6  


 


RDW  14.8  


 


Plt Count  321  


 


MPV  8.2  


 


Sodium   141 


 


Potassium   4.0 


 


Chloride   101 


 


Carbon Dioxide   28 


 


Anion Gap   12 


 


BUN   8 


 


Creatinine   1.0 


 


Creat Clearance w eGFR   > 60 


 


Random Glucose   74 


 


Calcium   8.7 


 


Total Bilirubin   0.7 


 


AST   157 H D 


 


ALT   89 H D 


 


Alkaline Phosphatase   95 


 


Total Protein   6.9 


 


Albumin   3.1 L 


 


RPR Titer    Nonreactive








LABS NOTED


ELEVATED AST AND ALT


Assessment: 





18 11:33


WITHDRAWAL SX


Plan: 





CONTINUE DETOX


F/U WITH PSYCH CONSULT

## 2018-08-23 RX ADMIN — PANTOPRAZOLE SODIUM SCH MG: 40 TABLET, DELAYED RELEASE ORAL at 10:41

## 2018-08-23 RX ADMIN — MIRTAZAPINE SCH MG: 15 TABLET, FILM COATED ORAL at 22:28

## 2018-08-23 RX ADMIN — Medication SCH MG: at 22:28

## 2018-08-23 RX ADMIN — SERTRALINE HYDROCHLORIDE SCH MG: 50 TABLET ORAL at 22:28

## 2018-08-23 RX ADMIN — Medication SCH TAB: at 10:41

## 2018-08-23 RX ADMIN — HYDROXYZINE PAMOATE PRN MG: 50 CAPSULE ORAL at 22:30

## 2018-08-23 RX ADMIN — NICOTINE SCH: 14 PATCH, EXTENDED RELEASE TRANSDERMAL at 10:43

## 2018-08-23 RX ADMIN — HYDROXYZINE PAMOATE PRN MG: 50 CAPSULE ORAL at 17:30

## 2018-08-23 RX ADMIN — LISINOPRIL SCH MG: 20 TABLET ORAL at 10:41

## 2018-08-23 NOTE — PN
BHS Progress Note (SOAP)


Subjective: 





C/O ANXIETY, TREMORS,SWEATS,FATIGUE. 


Objective: 





08/23/18 12:26


 Vital Signs











  08/23/18 08/23/18 08/23/18





  06:16 06:30 11:11


 


Temperature 97.8 F  98.4 F


 


Pulse Rate 81  92 H


 


Respiratory 20 18 18





Rate   


 


Blood Pressure 132/84  133/86








 Laboratory Tests











  08/21/18 08/21/18 08/21/18





  07:30 07:30 07:30


 


WBC  4.7  


 


RBC  4.58  


 


Hgb  13.6  


 


Hct  40.3  


 


MCV  88.0  


 


MCH  29.6  


 


MCHC  33.6  


 


RDW  14.8  


 


Plt Count  321  


 


MPV  8.2  


 


Sodium   141 


 


Potassium   4.0 


 


Chloride   101 


 


Carbon Dioxide   28 


 


Anion Gap   12 


 


BUN   8 


 


Creatinine   1.0 


 


Creat Clearance w eGFR   > 60 


 


Random Glucose   74 


 


Calcium   8.7 


 


Total Bilirubin   0.7 


 


AST   157 H D 


 


ALT   89 H D 


 


Alkaline Phosphatase   95 


 


Total Protein   6.9 


 


Albumin   3.1 L 


 


RPR Titer    Nonreactive











Assessment: 





08/23/18 12:28


WITHDRAWAL SX


ELEVATED LIVER ENZYMES





Plan: 





CONTINUE DETOX


INCREASE PO FLUIDS.


REPEAT LIVER PROFILE TODAY.

## 2018-08-24 LAB
ALBUMIN SERPL-MCNC: 2.9 G/DL (ref 3.4–5)
ALP SERPL-CCNC: 102 U/L (ref 45–117)
ALT SERPL-CCNC: 60 U/L (ref 12–78)
AST SERPL-CCNC: 57 U/L (ref 15–37)
BILIRUB CONJ SERPL-MCNC: < 0.2 MG/DL (ref 0–0.2)
BILIRUB SERPL-MCNC: 0.2 MG/DL (ref 0.2–1)
INR BLD: 1.01 (ref 0.83–1.09)
PROT SERPL-MCNC: 6.3 G/DL (ref 6.4–8.2)
PT PNL PPP: 11.4 SEC (ref 9.7–13)

## 2018-08-24 RX ADMIN — SERTRALINE HYDROCHLORIDE SCH MG: 50 TABLET ORAL at 22:32

## 2018-08-24 RX ADMIN — HYDROXYZINE PAMOATE PRN MG: 50 CAPSULE ORAL at 17:34

## 2018-08-24 RX ADMIN — HYDROXYZINE PAMOATE PRN MG: 50 CAPSULE ORAL at 22:36

## 2018-08-24 RX ADMIN — Medication PRN MG: at 22:33

## 2018-08-24 RX ADMIN — MIRTAZAPINE SCH MG: 15 TABLET, FILM COATED ORAL at 22:32

## 2018-08-24 RX ADMIN — Medication SCH MG: at 22:32

## 2018-08-24 RX ADMIN — Medication SCH TAB: at 10:40

## 2018-08-24 RX ADMIN — NICOTINE SCH: 14 PATCH, EXTENDED RELEASE TRANSDERMAL at 10:40

## 2018-08-24 RX ADMIN — LISINOPRIL SCH MG: 20 TABLET ORAL at 10:40

## 2018-08-24 RX ADMIN — HYDROXYZINE PAMOATE PRN MG: 50 CAPSULE ORAL at 10:41

## 2018-08-24 RX ADMIN — PANTOPRAZOLE SODIUM SCH MG: 40 TABLET, DELAYED RELEASE ORAL at 10:40

## 2018-08-24 RX ADMIN — HYDROXYZINE PAMOATE PRN MG: 50 CAPSULE ORAL at 05:45

## 2018-08-24 NOTE — PN
BHS Progress Note (SOAP)


Subjective: 





ANXIETY,BODY ACHES,CHILLS, DRY MOUTH.


Objective: 





08/24/18 12:02


 Vital Signs











  08/24/18 08/24/18





  06:18 09:12


 


Temperature 97.4 F L 98.2 F


 


Pulse Rate 88 88


 


Respiratory 20 18





Rate  


 


Blood Pressure 134/86 144/89








 Laboratory Tests











  08/21/18 08/21/18 08/21/18





  07:30 07:30 07:30


 


WBC  4.7  


 


RBC  4.58  


 


Hgb  13.6  


 


Hct  40.3  


 


MCV  88.0  


 


MCH  29.6  


 


MCHC  33.6  


 


RDW  14.8  


 


Plt Count  321  


 


MPV  8.2  


 


PT with INR   


 


INR   


 


Sodium   141 


 


Potassium   4.0 


 


Chloride   101 


 


Carbon Dioxide   28 


 


Anion Gap   12 


 


BUN   8 


 


Creatinine   1.0 


 


Creat Clearance w eGFR   > 60 


 


Random Glucose   74 


 


Calcium   8.7 


 


Total Bilirubin   0.7 


 


Direct Bilirubin   


 


AST   157 H D 


 


ALT   89 H D 


 


Alkaline Phosphatase   95 


 


Total Protein   6.9 


 


Albumin   3.1 L 


 


RPR Titer    Nonreactive














  08/24/18 08/24/18





  07:00 07:00


 


WBC  


 


RBC  


 


Hgb  


 


Hct  


 


MCV  


 


MCH  


 


MCHC  


 


RDW  


 


Plt Count  


 


MPV  


 


PT with INR   11.40


 


INR   1.01


 


Sodium  


 


Potassium  


 


Chloride  


 


Carbon Dioxide  


 


Anion Gap  


 


BUN  


 


Creatinine  


 


Creat Clearance w eGFR  


 


Random Glucose  


 


Calcium  


 


Total Bilirubin  0.2 


 


Direct Bilirubin  < 0.2 


 


AST  57 H D 


 


ALT  60  D 


 


Alkaline Phosphatase  102 


 


Total Protein  6.3 L 


 


Albumin  2.9 L 


 


RPR Titer  











Assessment: 





08/24/18 12:02


WITHDRAWAL SX


Plan: 





CONTINUE DETOX


INCREASE PO FLUIDS

## 2018-08-25 VITALS — DIASTOLIC BLOOD PRESSURE: 73 MMHG | HEART RATE: 92 BPM | TEMPERATURE: 97.2 F | SYSTOLIC BLOOD PRESSURE: 113 MMHG

## 2018-08-25 RX ADMIN — Medication SCH: at 11:03

## 2018-08-25 RX ADMIN — LISINOPRIL SCH: 20 TABLET ORAL at 11:03

## 2018-08-25 RX ADMIN — PANTOPRAZOLE SODIUM SCH: 40 TABLET, DELAYED RELEASE ORAL at 11:03

## 2018-08-25 RX ADMIN — NICOTINE SCH: 14 PATCH, EXTENDED RELEASE TRANSDERMAL at 11:03

## 2018-08-25 NOTE — PN
BHS Progress Note


Note: 





Psychiatry


Attending's on-call note :


Called by medical NP Lowell.


To enter scripts for patient.


Mr Tompkins is getting discharged today.


Needs prescriptions for mirtazapine + sertraline.


Chart reviewed.


Psychiatric NP Padmaja's notes : appreciated.


Medications confirmed.Scripts sent to :


[facility] ([facility]


Remeron 15 mg po hs # 30


Zoloft 50 mg po daily # 30

## 2018-08-25 NOTE — PN
BHS Progress Note (SOAP)


Subjective: 





Patient denies current Detox symptoms and reports that he feels well overall.


Objective: 


PATIENT A & O X 3, OBSERVED AMBULATING ON UNIT. NO ACUTE DISTRESS.





08/25/18 20:28


 Vital Signs











Temperature  97.2 F L  08/25/18 09:32


 


Pulse Rate  92 H  08/25/18 09:32


 


Respiratory Rate  18   08/25/18 09:32


 


Blood Pressure  113/73   08/25/18 09:32


 


O2 Sat by Pulse Oximetry (%)      








 Laboratory Tests











  08/21/18 08/21/18 08/21/18





  07:30 07:30 07:30


 


WBC  4.7  


 


RBC  4.58  


 


Hgb  13.6  


 


Hct  40.3  


 


MCV  88.0  


 


MCH  29.6  


 


MCHC  33.6  


 


RDW  14.8  


 


Plt Count  321  


 


MPV  8.2  


 


PT with INR   


 


INR   


 


Sodium   141 


 


Potassium   4.0 


 


Chloride   101 


 


Carbon Dioxide   28 


 


Anion Gap   12 


 


BUN   8 


 


Creatinine   1.0 


 


Creat Clearance w eGFR   > 60 


 


Random Glucose   74 


 


Calcium   8.7 


 


Total Bilirubin   0.7 


 


Direct Bilirubin   


 


AST   157 H D 


 


ALT   89 H D 


 


Alkaline Phosphatase   95 


 


Total Protein   6.9 


 


Albumin   3.1 L 


 


RPR Titer    Nonreactive














  08/24/18 08/24/18





  07:00 07:00


 


WBC  


 


RBC  


 


Hgb  


 


Hct  


 


MCV  


 


MCH  


 


MCHC  


 


RDW  


 


Plt Count  


 


MPV  


 


PT with INR   11.40


 


INR   1.01


 


Sodium  


 


Potassium  


 


Chloride  


 


Carbon Dioxide  


 


Anion Gap  


 


BUN  


 


Creatinine  


 


Creat Clearance w eGFR  


 


Random Glucose  


 


Calcium  


 


Total Bilirubin  0.2 


 


Direct Bilirubin  < 0.2 


 


AST  57 H D 


 


ALT  60  D 


 


Alkaline Phosphatase  102 


 


Total Protein  6.3 L 


 


Albumin  2.9 L 


 


RPR Titer  








LABS NOTED.


Assessment: 





08/25/18 20:29


COMPLETION OF DETOX REGIMEN.


Plan: 





PATIENT SCHEDULED FOR DISCHARGE FROM DETOX UNIT TODAY.

## 2018-08-25 NOTE — DS
BHS Detox Discharge Summary


Admission Date: 


08/20/18





Discharge Date: 08/25/18





- History


Present History: Alcohol Dependence


Additional Comments: 





NO BEDS ARE AVAILABLE AT [facility]. PATIENT WILL RETURN TO HIS 

SHELTER IN [facility] FOR THE TIME BEING AND [facility] TO INQUIRE ABOUT POSSIBLE REHAB 

ADMISSION AT THAT TIME. PATIENT WAS DISCHARGED FROM DETOX UNIT IN STABLE 

MEDICAL CONDITION.


Pertinent Past History: 





History of Depression, PTSD, HTN, G.E.R.D., Anxiety.





- Physical Exam Results


Vital Signs: 


 Vital Signs











Temperature  97.2 F L  08/25/18 09:32


 


Pulse Rate  92 H  08/25/18 09:32


 


Respiratory Rate  18   08/25/18 09:32


 


Blood Pressure  113/73   08/25/18 09:32


 


O2 Sat by Pulse Oximetry (%)      











Pertinent Admission Physical Exam Findings: 





WITHDRAWAL SYMPTOMS.





 Laboratory Tests











  08/21/18 08/21/18 08/21/18





  07:30 07:30 07:30


 


WBC  4.7  


 


RBC  4.58  


 


Hgb  13.6  


 


Hct  40.3  


 


MCV  88.0  


 


MCH  29.6  


 


MCHC  33.6  


 


RDW  14.8  


 


Plt Count  321  


 


MPV  8.2  


 


PT with INR   


 


INR   


 


Sodium   141 


 


Potassium   4.0 


 


Chloride   101 


 


Carbon Dioxide   28 


 


Anion Gap   12 


 


BUN   8 


 


Creatinine   1.0 


 


Creat Clearance w eGFR   > 60 


 


Random Glucose   74 


 


Calcium   8.7 


 


Total Bilirubin   0.7 


 


Direct Bilirubin   


 


AST   157 H D 


 


ALT   89 H D 


 


Alkaline Phosphatase   95 


 


Total Protein   6.9 


 


Albumin   3.1 L 


 


RPR Titer    Nonreactive














  08/24/18 08/24/18





  07:00 07:00


 


WBC  


 


RBC  


 


Hgb  


 


Hct  


 


MCV  


 


MCH  


 


MCHC  


 


RDW  


 


Plt Count  


 


MPV  


 


PT with INR   11.40


 


INR   1.01


 


Sodium  


 


Potassium  


 


Chloride  


 


Carbon Dioxide  


 


Anion Gap  


 


BUN  


 


Creatinine  


 


Creat Clearance w eGFR  


 


Random Glucose  


 


Calcium  


 


Total Bilirubin  0.2 


 


Direct Bilirubin  < 0.2 


 


AST  57 H D 


 


ALT  60  D 


 


Alkaline Phosphatase  102 


 


Total Protein  6.3 L 


 


Albumin  2.9 L 


 


RPR Titer  








LABS NOTED.





- Treatment


Hospital Course: Detox Protocol Followed, Detoxed Safely, Responded well, 

Discharged Condition Good, Rehab Referral Accepted


[facility]

.





- Medication


Discharge Medications: 


Ambulatory Orders





Folic Acid - 1 mg PO DAILY 06/20/17 


Multivitamin [Poly-Vitamin] 1 each PO DAILY 06/20/17 


Thiamine Mononitrate [Vitamin B-1] 100 mg PO DAILY 06/20/17 


traZODone HCL [Desyrel -] 150 mg PO HS 06/20/17 


Mirtazapine [Remeron -] 15 mg PO HS #30 tablet 07/17/17 


Pantoprazole Sodium [Protonix -] 40 mg PO DAILY #30 tab 07/17/17 


Sertraline HCl [Zoloft] 200 mg PO HS #60 tablet 07/17/17 


Lisinopril [Prinivil] 20 mg PO DAILY 30 Days #30 tab 08/25/18 


Mirtazapine [Remeron -] 15 mg PO HS #30 tablet 08/25/18 


Quetiapine Fumarate [Seroquel -] 50 mg PO HS #30 tablet 08/25/18 











- Diagnosis


(1) Alcohol dependence with uncomplicated withdrawal


Status: Acute   





(2) Nicotine dependence


Status: Acute   


Qualifiers: 


   Nicotine product type: cigarettes   Substance use status: in withdrawal   

Qualified Code(s): F17.213 - Nicotine dependence, cigarettes, with withdrawal   





(3) GERD (gastroesophageal reflux disease)


Status: Chronic   


Qualifiers: 


   Esophagitis presence: esophagitis presence not specified   Qualified Code(s)

: K21.9 - Gastro-esophageal reflux disease without esophagitis   





(4) Hypertension


Status: Chronic   


Qualifiers: 


   Hypertension type: essential hypertension   Qualified Code(s): I10 - 

Essential (primary) hypertension   





(5) Anxiety disorder


Status: Chronic   


Qualifiers: 


   Anxiety disorder type: unspecified anxiety disorder   Qualified Code(s): 

F41.9 - Anxiety disorder, unspecified   





(6) Depression


Status: Suspected   


Qualifiers: 


   Depression Type: unspecified   Qualified Code(s): F32.9 - Major depressive 

disorder, single episode, unspecified   





- AMA


Did Patient Leave Against Medical Advice: No